# Patient Record
Sex: FEMALE | Race: BLACK OR AFRICAN AMERICAN | Employment: UNEMPLOYED | URBAN - METROPOLITAN AREA
[De-identification: names, ages, dates, MRNs, and addresses within clinical notes are randomized per-mention and may not be internally consistent; named-entity substitution may affect disease eponyms.]

---

## 2021-08-31 ENCOUNTER — HOSPITAL ENCOUNTER (EMERGENCY)
Age: 56
Discharge: STILL ADMITTED OR ADMINISTRATIVE | End: 2021-08-31

## 2021-11-17 ENCOUNTER — HOSPITAL ENCOUNTER (INPATIENT)
Age: 56
LOS: 2 days | Discharge: HOME | DRG: 194 | End: 2021-11-20
Attending: EMERGENCY MEDICINE | Admitting: FAMILY MEDICINE
Payer: MEDICAID

## 2021-11-17 ENCOUNTER — APPOINTMENT (OUTPATIENT)
Dept: GENERAL RADIOLOGY | Age: 56
DRG: 194 | End: 2021-11-17
Attending: EMERGENCY MEDICINE
Payer: MEDICAID

## 2021-11-17 DIAGNOSIS — R77.8 TROPONIN I ABOVE REFERENCE RANGE: ICD-10-CM

## 2021-11-17 DIAGNOSIS — R94.31 ABNORMAL EKG: ICD-10-CM

## 2021-11-17 DIAGNOSIS — R06.02 SOB (SHORTNESS OF BREATH): ICD-10-CM

## 2021-11-17 DIAGNOSIS — I50.9 ACUTE CONGESTIVE HEART FAILURE, UNSPECIFIED HEART FAILURE TYPE (HCC): Primary | ICD-10-CM

## 2021-11-17 PROCEDURE — 99285 EMERGENCY DEPT VISIT HI MDM: CPT

## 2021-11-17 PROCEDURE — 71046 X-RAY EXAM CHEST 2 VIEWS: CPT

## 2021-11-17 PROCEDURE — 94762 N-INVAS EAR/PLS OXIMTRY CONT: CPT

## 2021-11-17 RX ORDER — IPRATROPIUM BROMIDE AND ALBUTEROL SULFATE 2.5; .5 MG/3ML; MG/3ML
3 SOLUTION RESPIRATORY (INHALATION)
Status: COMPLETED | OUTPATIENT
Start: 2021-11-17 | End: 2021-11-18

## 2021-11-18 ENCOUNTER — APPOINTMENT (OUTPATIENT)
Dept: CT IMAGING | Age: 56
DRG: 194 | End: 2021-11-18
Attending: EMERGENCY MEDICINE
Payer: MEDICAID

## 2021-11-18 ENCOUNTER — APPOINTMENT (OUTPATIENT)
Dept: NON INVASIVE DIAGNOSTICS | Age: 56
DRG: 194 | End: 2021-11-18
Attending: FAMILY MEDICINE
Payer: MEDICAID

## 2021-11-18 PROBLEM — I50.9 ACUTE CHF (CONGESTIVE HEART FAILURE) (HCC): Status: ACTIVE | Noted: 2021-11-18

## 2021-11-18 PROBLEM — Z77.22 EXPOSURE TO SECOND HAND SMOKE: Status: ACTIVE | Noted: 2021-11-18

## 2021-11-18 PROBLEM — R77.8 ELEVATED TROPONIN: Status: ACTIVE | Noted: 2021-11-18

## 2021-11-18 LAB
ALBUMIN SERPL-MCNC: 3.5 G/DL (ref 3.4–5)
ALBUMIN/GLOB SERPL: 0.9 {RATIO} (ref 0.8–1.7)
ALP SERPL-CCNC: 114 U/L (ref 45–117)
ALT SERPL-CCNC: 24 U/L (ref 13–56)
ANION GAP SERPL CALC-SCNC: 6 MMOL/L (ref 3–18)
AST SERPL-CCNC: 17 U/L (ref 10–38)
ATRIAL RATE: 114 BPM
BASOPHILS # BLD: 0.1 K/UL (ref 0–0.1)
BASOPHILS NFR BLD: 1 % (ref 0–2)
BILIRUB SERPL-MCNC: 0.5 MG/DL (ref 0.2–1)
BNP SERPL-MCNC: 6793 PG/ML (ref 0–900)
BUN SERPL-MCNC: 16 MG/DL (ref 7–18)
BUN/CREAT SERPL: 15 (ref 12–20)
CALCIUM SERPL-MCNC: 9.1 MG/DL (ref 8.5–10.1)
CALCULATED P AXIS, ECG09: 27 DEGREES
CALCULATED R AXIS, ECG10: -13 DEGREES
CALCULATED T AXIS, ECG11: 21 DEGREES
CHLORIDE SERPL-SCNC: 109 MMOL/L (ref 100–111)
CHOLEST SERPL-MCNC: 150 MG/DL
CK MB CFR SERPL CALC: ABNORMAL % (ref 0–4)
CK MB CFR SERPL CALC: ABNORMAL % (ref 0–4)
CK MB SERPL-MCNC: <1 NG/ML (ref 5–25)
CK MB SERPL-MCNC: <1 NG/ML (ref 5–25)
CK SERPL-CCNC: 68 U/L (ref 26–192)
CK SERPL-CCNC: 83 U/L (ref 26–192)
CO2 SERPL-SCNC: 24 MMOL/L (ref 21–32)
COVID-19 RAPID TEST, COVR: NOT DETECTED
CREAT SERPL-MCNC: 1.1 MG/DL (ref 0.6–1.3)
D DIMER PPP FEU-MCNC: 0.54 UG/ML(FEU)
DIAGNOSIS, 93000: NORMAL
DIFFERENTIAL METHOD BLD: ABNORMAL
ECHO AO ROOT DIAM: 2.37 CM
ECHO AV AREA PEAK VELOCITY: 2.4 CM2
ECHO AV AREA VTI: 2.02 CM2
ECHO AV AREA/BSA PEAK VELOCITY: 1.2 CM2/M2
ECHO AV AREA/BSA VTI: 1 CM2/M2
ECHO AV MEAN GRADIENT: 4.67 MMHG
ECHO AV PEAK GRADIENT: 6.82 MMHG
ECHO AV PEAK VELOCITY: 130.58 CM/S
ECHO AV VTI: 23.89 CM
ECHO EST RA PRESSURE: 3 MMHG
ECHO IVC PROX: 1.97 CM
ECHO LA AREA 4C: 29.08 CM2
ECHO LA MAJOR AXIS: 4.4 CM
ECHO LA MINOR AXIS: 2.19 CM
ECHO LA VOL 2C: 63.47 ML (ref 22–52)
ECHO LA VOL 4C: 84.96 ML (ref 22–52)
ECHO LA VOL BP: 85.51 ML (ref 22–52)
ECHO LA VOL/BSA BIPLANE: 42.54 ML/M2 (ref 16–28)
ECHO LA VOLUME INDEX A2C: 31.58 ML/M2 (ref 16–28)
ECHO LA VOLUME INDEX A4C: 42.27 ML/M2 (ref 16–28)
ECHO LV E' LATERAL VELOCITY: 14.52 CM/S
ECHO LV E' SEPTAL VELOCITY: 5.24 CM/S
ECHO LV EDV A2C: 124.52 ML
ECHO LV EDV A4C: 139.84 ML
ECHO LV EDV BP: 132.34 ML (ref 56–104)
ECHO LV EDV INDEX A4C: 69.6 ML/M2
ECHO LV EDV INDEX BP: 65.8 ML/M2
ECHO LV EDV NDEX A2C: 62 ML/M2
ECHO LV EJECTION FRACTION A2C: 28 PERCENT
ECHO LV EJECTION FRACTION A4C: 38 PERCENT
ECHO LV EJECTION FRACTION BIPLANE: 33.2 PERCENT (ref 55–100)
ECHO LV ESV A2C: 89.54 ML
ECHO LV ESV A4C: 87.26 ML
ECHO LV ESV BP: 88.36 ML (ref 19–49)
ECHO LV ESV INDEX A2C: 44.5 ML/M2
ECHO LV ESV INDEX A4C: 43.4 ML/M2
ECHO LV ESV INDEX BP: 44 ML/M2
ECHO LV GLOBAL LONGITUDINAL STRAIN (GLS): -9.6 PERCENT
ECHO LV INTERNAL DIMENSION DIASTOLIC: 4.69 CM (ref 3.9–5.3)
ECHO LV INTERNAL DIMENSION SYSTOLIC: 3.8 CM
ECHO LV IVSD: 0.94 CM (ref 0.6–0.9)
ECHO LV MASS 2D: 170.6 G (ref 67–162)
ECHO LV MASS INDEX 2D: 84.9 G/M2 (ref 43–95)
ECHO LV POSTERIOR WALL DIASTOLIC: 1.12 CM (ref 0.6–0.9)
ECHO LVOT CARDIAC OUTPUT: 4.88 LITER/MINUTE
ECHO LVOT DIAM: 2.36 CM
ECHO LVOT PEAK GRADIENT: 2.05 MMHG
ECHO LVOT PEAK VELOCITY: 71.62 CM/S
ECHO LVOT SV: 48.3 ML
ECHO LVOT VTI: 11.05 CM
ECHO MV A VELOCITY: 35.83 CM/S
ECHO MV E DECELERATION TIME (DT): 54.41 MS
ECHO MV E VELOCITY: 80.4 CM/S
ECHO MV E/A RATIO: 2.24
ECHO MV E/E' LATERAL: 5.54
ECHO MV E/E' RATIO (AVERAGED): 10.44
ECHO MV E/E' SEPTAL: 15.34
ECHO MV REGURGITANT VTIA: 135.11 CM
ECHO RA AREA 4C: 20.27 CM2
ECHO RV INTERNAL DIMENSION: 3.44 CM
ECHO RV TAPSE: 1.25 CM (ref 1.5–2)
ECHO TV REGURGITANT MAX VELOCITY: 335.83 CM/S
ECHO TV REGURGITANT PEAK GRADIENT: 45.11 MMHG
EOSINOPHIL # BLD: 0.2 K/UL (ref 0–0.4)
EOSINOPHIL NFR BLD: 2 % (ref 0–5)
ERYTHROCYTE [DISTWIDTH] IN BLOOD BY AUTOMATED COUNT: 14.3 % (ref 11.6–14.5)
ERYTHROCYTE [DISTWIDTH] IN BLOOD BY AUTOMATED COUNT: 14.5 % (ref 11.6–14.5)
FLUAV AG NPH QL IA: NEGATIVE
FLUBV AG NOSE QL IA: NEGATIVE
GLOBAL LONGITUDINAL STRAIN 2 CHAMBER: -9.9 PERCENT
GLOBAL LONGITUDINAL STRAIN 4 CHAMBER: -9.9 PERCENT
GLOBAL LONGITUDINAL STRAIN LONG AXIS: -9.1 PERCENT
GLOBULIN SER CALC-MCNC: 3.7 G/DL (ref 2–4)
GLUCOSE SERPL-MCNC: 100 MG/DL (ref 74–99)
HCT VFR BLD AUTO: 38.8 % (ref 35–45)
HCT VFR BLD AUTO: 40 % (ref 35–45)
HDLC SERPL-MCNC: 39 MG/DL (ref 40–60)
HDLC SERPL: 3.8 {RATIO} (ref 0–5)
HGB BLD-MCNC: 12 G/DL (ref 12–16)
HGB BLD-MCNC: 12.5 G/DL (ref 12–16)
IMM GRANULOCYTES # BLD AUTO: 0 K/UL (ref 0–0.04)
IMM GRANULOCYTES NFR BLD AUTO: 0 % (ref 0–0.5)
LA VOL DISK BP: 81.96 ML (ref 22–52)
LDLC SERPL CALC-MCNC: 93.2 MG/DL (ref 0–100)
LIPID PROFILE,FLP: ABNORMAL
LVOT MG: 1.21 MMHG
LYMPHOCYTES # BLD: 1.9 K/UL (ref 0.9–3.6)
LYMPHOCYTES NFR BLD: 18 % (ref 21–52)
MAGNESIUM SERPL-MCNC: 1.9 MG/DL (ref 1.6–2.6)
MCH RBC QN AUTO: 26.5 PG (ref 24–34)
MCH RBC QN AUTO: 26.6 PG (ref 24–34)
MCHC RBC AUTO-ENTMCNC: 30.9 G/DL (ref 31–37)
MCHC RBC AUTO-ENTMCNC: 31.3 G/DL (ref 31–37)
MCV RBC AUTO: 84.9 FL (ref 78–100)
MCV RBC AUTO: 86 FL (ref 78–100)
MONOCYTES # BLD: 0.6 K/UL (ref 0.05–1.2)
MONOCYTES NFR BLD: 6 % (ref 3–10)
MR MG: 74.39 MMHG
MV DEC SLOPE: 14.78 METER/SECOND2
NEUTS SEG # BLD: 7.5 K/UL (ref 1.8–8)
NEUTS SEG NFR BLD: 73 % (ref 40–73)
NRBC # BLD: 0 K/UL (ref 0–0.01)
NRBC # BLD: 0 K/UL (ref 0–0.01)
NRBC BLD-RTO: 0 PER 100 WBC
NRBC BLD-RTO: 0 PER 100 WBC
P-R INTERVAL, ECG05: 170 MS
PLATELET # BLD AUTO: 292 K/UL (ref 135–420)
PLATELET # BLD AUTO: 344 K/UL (ref 135–420)
PMV BLD AUTO: 10.1 FL (ref 9.2–11.8)
PMV BLD AUTO: 10.6 FL (ref 9.2–11.8)
POTASSIUM SERPL-SCNC: 3.8 MMOL/L (ref 3.5–5.5)
PROT SERPL-MCNC: 7.2 G/DL (ref 6.4–8.2)
Q-T INTERVAL, ECG07: 330 MS
QRS DURATION, ECG06: 94 MS
QTC CALCULATION (BEZET), ECG08: 454 MS
RBC # BLD AUTO: 4.51 M/UL (ref 4.2–5.3)
RBC # BLD AUTO: 4.71 M/UL (ref 4.2–5.3)
SARS-COV-2, COV2: NORMAL
SARS-COV-2, NAA: NOT DETECTED
SODIUM SERPL-SCNC: 139 MMOL/L (ref 136–145)
SOURCE, COVRS: NORMAL
T4 FREE SERPL-MCNC: 1.2 NG/DL (ref 0.7–1.5)
TRIGL SERPL-MCNC: 89 MG/DL (ref ?–150)
TROPONIN I SERPL-MCNC: 0.15 NG/ML (ref 0–0.04)
TROPONIN I SERPL-MCNC: 0.17 NG/ML (ref 0–0.04)
TSH SERPL DL<=0.05 MIU/L-ACNC: 0.58 UIU/ML (ref 0.36–3.74)
VENTRICULAR RATE, ECG03: 114 BPM
VLDLC SERPL CALC-MCNC: 17.8 MG/DL
WBC # BLD AUTO: 10.3 K/UL (ref 4.6–13.2)
WBC # BLD AUTO: 9.3 K/UL (ref 4.6–13.2)

## 2021-11-18 PROCEDURE — 74011250636 HC RX REV CODE- 250/636: Performed by: FAMILY MEDICINE

## 2021-11-18 PROCEDURE — 84443 ASSAY THYROID STIM HORMONE: CPT

## 2021-11-18 PROCEDURE — 36415 COLL VENOUS BLD VENIPUNCTURE: CPT

## 2021-11-18 PROCEDURE — U0005 INFEC AGEN DETEC AMPLI PROBE: HCPCS

## 2021-11-18 PROCEDURE — 85027 COMPLETE CBC AUTOMATED: CPT

## 2021-11-18 PROCEDURE — 74011250636 HC RX REV CODE- 250/636: Performed by: EMERGENCY MEDICINE

## 2021-11-18 PROCEDURE — 74011250637 HC RX REV CODE- 250/637: Performed by: EMERGENCY MEDICINE

## 2021-11-18 PROCEDURE — 80061 LIPID PANEL: CPT

## 2021-11-18 PROCEDURE — 82553 CREATINE MB FRACTION: CPT

## 2021-11-18 PROCEDURE — 85379 FIBRIN DEGRADATION QUANT: CPT

## 2021-11-18 PROCEDURE — 74011000636 HC RX REV CODE- 636: Performed by: EMERGENCY MEDICINE

## 2021-11-18 PROCEDURE — 77030013140 HC MSK NEB VYRM -A

## 2021-11-18 PROCEDURE — 83735 ASSAY OF MAGNESIUM: CPT

## 2021-11-18 PROCEDURE — 83880 ASSAY OF NATRIURETIC PEPTIDE: CPT

## 2021-11-18 PROCEDURE — 85025 COMPLETE CBC W/AUTO DIFF WBC: CPT

## 2021-11-18 PROCEDURE — 93005 ELECTROCARDIOGRAM TRACING: CPT

## 2021-11-18 PROCEDURE — 84439 ASSAY OF FREE THYROXINE: CPT

## 2021-11-18 PROCEDURE — 74011000250 HC RX REV CODE- 250: Performed by: EMERGENCY MEDICINE

## 2021-11-18 PROCEDURE — 80053 COMPREHEN METABOLIC PANEL: CPT

## 2021-11-18 PROCEDURE — 93306 TTE W/DOPPLER COMPLETE: CPT

## 2021-11-18 PROCEDURE — 71275 CT ANGIOGRAPHY CHEST: CPT

## 2021-11-18 PROCEDURE — 74011250637 HC RX REV CODE- 250/637: Performed by: INTERNAL MEDICINE

## 2021-11-18 PROCEDURE — 74011250636 HC RX REV CODE- 250/636: Performed by: HOSPITALIST

## 2021-11-18 PROCEDURE — 74011250637 HC RX REV CODE- 250/637: Performed by: FAMILY MEDICINE

## 2021-11-18 PROCEDURE — 87804 INFLUENZA ASSAY W/OPTIC: CPT

## 2021-11-18 PROCEDURE — 65660000000 HC RM CCU STEPDOWN

## 2021-11-18 PROCEDURE — 96374 THER/PROPH/DIAG INJ IV PUSH: CPT

## 2021-11-18 PROCEDURE — 94640 AIRWAY INHALATION TREATMENT: CPT

## 2021-11-18 PROCEDURE — 87635 SARS-COV-2 COVID-19 AMP PRB: CPT

## 2021-11-18 RX ORDER — POLYETHYLENE GLYCOL 3350 17 G/17G
17 POWDER, FOR SOLUTION ORAL DAILY PRN
Status: DISCONTINUED | OUTPATIENT
Start: 2021-11-18 | End: 2021-11-20 | Stop reason: HOSPADM

## 2021-11-18 RX ORDER — FUROSEMIDE 10 MG/ML
20 INJECTION INTRAMUSCULAR; INTRAVENOUS ONCE
Status: COMPLETED | OUTPATIENT
Start: 2021-11-18 | End: 2021-11-18

## 2021-11-18 RX ORDER — FUROSEMIDE 10 MG/ML
20 INJECTION INTRAMUSCULAR; INTRAVENOUS EVERY 12 HOURS
Status: DISCONTINUED | OUTPATIENT
Start: 2021-11-18 | End: 2021-11-19

## 2021-11-18 RX ORDER — SODIUM CHLORIDE 0.9 % (FLUSH) 0.9 %
5-40 SYRINGE (ML) INJECTION AS NEEDED
Status: DISCONTINUED | OUTPATIENT
Start: 2021-11-18 | End: 2021-11-20 | Stop reason: HOSPADM

## 2021-11-18 RX ORDER — ACETAMINOPHEN 325 MG/1
650 TABLET ORAL
Status: DISCONTINUED | OUTPATIENT
Start: 2021-11-18 | End: 2021-11-19 | Stop reason: SDUPTHER

## 2021-11-18 RX ORDER — FUROSEMIDE 10 MG/ML
40 INJECTION INTRAMUSCULAR; INTRAVENOUS EVERY 12 HOURS
Status: DISCONTINUED | OUTPATIENT
Start: 2021-11-18 | End: 2021-11-18

## 2021-11-18 RX ORDER — SODIUM CHLORIDE 0.9 % (FLUSH) 0.9 %
5-40 SYRINGE (ML) INJECTION EVERY 8 HOURS
Status: DISCONTINUED | OUTPATIENT
Start: 2021-11-18 | End: 2021-11-20 | Stop reason: HOSPADM

## 2021-11-18 RX ORDER — AMLODIPINE BESYLATE 5 MG/1
10 TABLET ORAL DAILY
Status: DISCONTINUED | OUTPATIENT
Start: 2021-11-18 | End: 2021-11-18

## 2021-11-18 RX ORDER — METOPROLOL TARTRATE 25 MG/1
12.5 TABLET, FILM COATED ORAL EVERY 12 HOURS
Status: DISCONTINUED | OUTPATIENT
Start: 2021-11-18 | End: 2021-11-18

## 2021-11-18 RX ORDER — ENOXAPARIN SODIUM 100 MG/ML
1 INJECTION SUBCUTANEOUS EVERY 12 HOURS
Status: DISCONTINUED | OUTPATIENT
Start: 2021-11-18 | End: 2021-11-18

## 2021-11-18 RX ORDER — ONDANSETRON 2 MG/ML
4 INJECTION INTRAMUSCULAR; INTRAVENOUS
Status: DISCONTINUED | OUTPATIENT
Start: 2021-11-18 | End: 2021-11-20 | Stop reason: HOSPADM

## 2021-11-18 RX ORDER — ISOSORBIDE DINITRATE 5 MG/1
2.5 TABLET ORAL 2 TIMES DAILY
Status: DISCONTINUED | OUTPATIENT
Start: 2021-11-19 | End: 2021-11-20 | Stop reason: HOSPADM

## 2021-11-18 RX ORDER — HYDRALAZINE HYDROCHLORIDE 10 MG/1
10 TABLET, FILM COATED ORAL DAILY
COMMUNITY
End: 2021-11-20

## 2021-11-18 RX ORDER — GUAIFENESIN 100 MG/5ML
324 LIQUID (ML) ORAL
Status: COMPLETED | OUTPATIENT
Start: 2021-11-18 | End: 2021-11-18

## 2021-11-18 RX ORDER — NITROGLYCERIN 0.4 MG/1
0.4 TABLET SUBLINGUAL
Status: DISCONTINUED | OUTPATIENT
Start: 2021-11-18 | End: 2021-11-20 | Stop reason: HOSPADM

## 2021-11-18 RX ORDER — HYDRALAZINE HYDROCHLORIDE 10 MG/1
10 TABLET, FILM COATED ORAL 3 TIMES DAILY
Status: DISCONTINUED | OUTPATIENT
Start: 2021-11-18 | End: 2021-11-20 | Stop reason: HOSPADM

## 2021-11-18 RX ORDER — ACETAMINOPHEN 325 MG/1
650 TABLET ORAL
Status: DISCONTINUED | OUTPATIENT
Start: 2021-11-18 | End: 2021-11-20 | Stop reason: HOSPADM

## 2021-11-18 RX ORDER — ENOXAPARIN SODIUM 100 MG/ML
40 INJECTION SUBCUTANEOUS EVERY 24 HOURS
Status: DISCONTINUED | OUTPATIENT
Start: 2021-11-19 | End: 2021-11-20 | Stop reason: HOSPADM

## 2021-11-18 RX ORDER — DOCUSATE SODIUM 100 MG/1
100 CAPSULE, LIQUID FILLED ORAL AS NEEDED
Status: DISCONTINUED | OUTPATIENT
Start: 2021-11-18 | End: 2021-11-20 | Stop reason: HOSPADM

## 2021-11-18 RX ORDER — ENOXAPARIN SODIUM 100 MG/ML
40 INJECTION SUBCUTANEOUS DAILY
Status: DISCONTINUED | OUTPATIENT
Start: 2021-11-18 | End: 2021-11-18

## 2021-11-18 RX ORDER — ISOSORBIDE DINITRATE 5 MG/1
5 TABLET ORAL 2 TIMES DAILY
Status: DISCONTINUED | OUTPATIENT
Start: 2021-11-19 | End: 2021-11-18

## 2021-11-18 RX ORDER — ONDANSETRON 2 MG/ML
4 INJECTION INTRAMUSCULAR; INTRAVENOUS
Status: DISCONTINUED | OUTPATIENT
Start: 2021-11-18 | End: 2021-11-19 | Stop reason: SDUPTHER

## 2021-11-18 RX ORDER — METOPROLOL SUCCINATE 25 MG/1
25 TABLET, EXTENDED RELEASE ORAL DAILY
Status: DISCONTINUED | OUTPATIENT
Start: 2021-11-19 | End: 2021-11-19

## 2021-11-18 RX ORDER — ONDANSETRON 4 MG/1
4 TABLET, ORALLY DISINTEGRATING ORAL
Status: DISCONTINUED | OUTPATIENT
Start: 2021-11-18 | End: 2021-11-20 | Stop reason: HOSPADM

## 2021-11-18 RX ORDER — ACETAMINOPHEN 650 MG/1
650 SUPPOSITORY RECTAL
Status: DISCONTINUED | OUTPATIENT
Start: 2021-11-18 | End: 2021-11-20 | Stop reason: HOSPADM

## 2021-11-18 RX ORDER — ASPIRIN 81 MG/1
81 TABLET ORAL DAILY
Status: DISCONTINUED | OUTPATIENT
Start: 2021-11-18 | End: 2021-11-20 | Stop reason: HOSPADM

## 2021-11-18 RX ADMIN — AMLODIPINE BESYLATE 10 MG: 5 TABLET ORAL at 09:38

## 2021-11-18 RX ADMIN — Medication 10 ML: at 06:31

## 2021-11-18 RX ADMIN — IPRATROPIUM BROMIDE AND ALBUTEROL SULFATE 3 ML: .5; 3 SOLUTION RESPIRATORY (INHALATION) at 00:23

## 2021-11-18 RX ADMIN — FUROSEMIDE 40 MG: 10 INJECTION, SOLUTION INTRAMUSCULAR; INTRAVENOUS at 09:37

## 2021-11-18 RX ADMIN — ASPIRIN 324 MG: 81 TABLET, CHEWABLE ORAL at 02:30

## 2021-11-18 RX ADMIN — FUROSEMIDE 20 MG: 10 INJECTION, SOLUTION INTRAMUSCULAR; INTRAVENOUS at 20:40

## 2021-11-18 RX ADMIN — ENOXAPARIN SODIUM 90 MG: 100 INJECTION SUBCUTANEOUS at 08:53

## 2021-11-18 RX ADMIN — ASPIRIN 81 MG: 81 TABLET, COATED ORAL at 09:38

## 2021-11-18 RX ADMIN — Medication 10 ML: at 15:38

## 2021-11-18 RX ADMIN — IOPAMIDOL 75 ML: 755 INJECTION, SOLUTION INTRAVENOUS at 02:55

## 2021-11-18 RX ADMIN — HYDRALAZINE HYDROCHLORIDE 10 MG: 10 TABLET, FILM COATED ORAL at 22:24

## 2021-11-18 RX ADMIN — ENOXAPARIN SODIUM 90 MG: 100 INJECTION SUBCUTANEOUS at 20:40

## 2021-11-18 RX ADMIN — METOPROLOL TARTRATE 12.5 MG: 25 TABLET, FILM COATED ORAL at 09:38

## 2021-11-18 RX ADMIN — FUROSEMIDE 20 MG: 10 INJECTION, SOLUTION INTRAMUSCULAR; INTRAVENOUS at 03:33

## 2021-11-18 NOTE — PROGRESS NOTES
Hospitalist Progress Note    Patient: Lake Olivares MRN: [id number]  CSN: [id number]    YOB: 1965  Age: 63 y.o. Sex: female    DOA: 11/17/2021 LOS:  LOS: 0 days          Chief Complaint:    SOB      Assessment/Plan     65-year-old woman with a history of hypertension it appears as if it may have been largely uncontrolled at home presents to the emergency department complaint of a 2-day history of worsening cough and shortness of breath. Admitted for possible new onset CHF     New onset congestive heart failure with elevated proBNP of greater than 6700  Uncontrolled hypertension  Mildly elevated D-dimer  Elevated troponin  Exposure to second-hand smoke    Echo result pending    BP coming under better control    Cardiology consult    DVT prophylaxis  Lasix has been given and she feels better    Daily lytes        Lovenox 1 mg/kg as precaution and defer continuing to cardiology     Had prior history of developing acute renal failure approximately 5 years ago and taken ACE inhibitor was discontinued so that we will not restart during this inpatient stay        DVT prophylaxis covered by Lovenox  GI prophylaxis covered by Pepcid     Code status: full      Disposition :  Patient Active Problem List   Diagnosis Code    Hypertension, accelerated I10    New onset of congestive heart failure ([facility] [address] I50.9    Elevated troponin R77.8    Exposure to second hand smoke Z77.22       Subjective:    I feel better  No complaints of cp, Palp, SOB now    Review of systems:      Respiratory: denies SOB, cough  Cardiovascular: denies chest pain, palpitations  Gastrointestinal: denies nausea, vomiting, diarrhea      Vital signs/Intake and Output:  Visit Vitals  BP (!) 152/101   Pulse 99   Temp 97.8 °F (36.6 °C)   Resp 22   Ht 5' 5\" (1.651 m)   Wt 94.9 kg (209 lb 3.5 oz)   SpO2 99%   BMI 34.82 kg/m²     Current Shift:  No intake/output data recorded.   Last three shifts:  No intake/output data recorded. Exam:    General: Well developed, alert, NAD, OX3  CVS:Regular rate and rhythm, no M/R/G, S1/S2 heard, no thrill  Lungs:Clear to auscultation bilaterally, no wheezes, rhonchi, or rales  Abdomen: Soft, Nontender, No distention, Normal Bowel sounds, No hepatomegaly  Extremities: 1 plus ankle edema LE BL  Neuro:grossly normal , follows commands  Psych:appropriate                Labs: Results:       Chemistry Recent Labs     11/18/21  0020   *      K 3.8      CO2 24   BUN 16   CREA 1.10   CA 9.1   AGAP 6   BUCR 15      TP 7.2   ALB 3.5   GLOB 3.7   AGRAT 0.9      CBC w/Diff Recent Labs     11/18/21  0538 11/18/21  0020   WBC 9.3 10.3   RBC 4.51 4.71   HGB 12.0 12.5   HCT 38.8 40.0    344   GRANS  --  73   LYMPH  --  18*   EOS  --  2      Cardiac Enzymes Recent Labs     11/18/21  0345 11/18/21  0020   CPK 68 83   CKND1 CALCULATION NOT PERFORMED WHEN RESULT IS BELOW LINEAR LIMIT CALCULATION NOT PERFORMED WHEN RESULT IS BELOW LINEAR LIMIT      Coagulation No results for input(s): PTP, INR, APTT, INREXT in the last 72 hours. Lipid Panel No results found for: CHOL, CHOLPOCT, CHOLX, CHLST, CHOLV, 083387, HDL, HDLP, LDL, LDLC, DLDLP, 219133, VLDLC, VLDL, TGLX, TRIGL, TRIGP, TGLPOCT, CHHD, CHHDX   BNP No results for input(s): BNPP in the last 72 hours.    Liver Enzymes Recent Labs     11/18/21  0020   TP 7.2   ALB 3.5         Thyroid Studies Lab Results   Component Value Date/Time    TSH 0.58 11/18/2021 05:38 AM        Procedures/imaging: see electronic medical records for all procedures/Xrays and details which were not copied into this note but were reviewed prior to creation of Gila Montero MD

## 2021-11-18 NOTE — PROGRESS NOTES
Problem: General Medical Care Plan  Goal: *Vital signs within specified parameters  Outcome: Progressing Towards Goal  Goal: *Labs within defined limits  Outcome: Progressing Towards Goal  Goal: *Absence of infection signs and symptoms  Outcome: Progressing Towards Goal  Goal: *Optimal pain control at patient's stated goal  Outcome: Progressing Towards Goal  Goal: *Skin integrity maintained  Outcome: Progressing Towards Goal  Goal: *Fluid volume balance  Outcome: Progressing Towards Goal  Goal: *Optimize nutritional status  Outcome: Progressing Towards Goal  Goal: *Anxiety reduced or absent  Outcome: Progressing Towards Goal  Goal: *Progressive mobility and function (eg: ADL's)  Outcome: Progressing Towards Goal     Problem: Patient Education: Go to Patient Education Activity  Goal: Patient/Family Education  Outcome: Progressing Towards Goal     Problem: Pain  Goal: *Control of Pain  Outcome: Progressing Towards Goal  Goal: *PALLIATIVE CARE:  Alleviation of Pain  Outcome: Progressing Towards Goal     Problem: Patient Education: Go to Patient Education Activity  Goal: Patient/Family Education  Outcome: Progressing Towards Goal     Problem: Falls - Risk of  Goal: *Absence of Falls  Description: Document Aixa Fall Risk and appropriate interventions in the flowsheet.   Outcome: Progressing Towards Goal  Note: Fall Risk Interventions:                                Problem: Patient Education: Go to Patient Education Activity  Goal: Patient/Family Education  Outcome: Progressing Towards Goal TBA

## 2021-11-18 NOTE — ROUTINE PROCESS
TRANSFER - IN REPORT:    Verbal report received from Balbir Foley RN(name) on Maggie Paul  being received from ED(unit) for routine progression of care      Report consisted of patients Situation, Background, Assessment and   Recommendations(SBAR). Information from the following report(s) SBAR, Kardex, ED Summary, Intake/Output, MAR, Recent Results, Med Rec Status and Cardiac Rhythm Tachy was reviewed with the receiving nurse. Opportunity for questions and clarification was provided. Assessment completed upon patients arrival to unit and care assumed. 0430 Patient assessment completed patient is resting quietly with eyes wide open and chest rising and falling evenly. No signs of pain or complaints of discomfort. Call bell is within reach. 2091 Patient requested something for her cough. I called the hospitalist for the patient. Bedside and Verbal shift change report given to Lorri1 Linnette Pederson (oncoming nurse) by Judith Carrion RN (offgoing nurse). Report included the following information SBAR, Kardex, ED Summary, Intake/Output, MAR, Recent Results, Med Rec Status and Cardiac Rhythm NSR.

## 2021-11-18 NOTE — ED TRIAGE NOTES
\"I have been sob since yesterday and I have had a cough as well. I have no energy\".  VSS nad noted 98% RA

## 2021-11-18 NOTE — PROGRESS NOTES
Care Management    Reason for Admission: new onset of congestive heart failure    Chart reviewed. Per H&P: Melba Stallworth is a 63 y.o. female   with a history of hypertension that appears as if it may have been largely uncontrolled at home, presents to emergency department complaining of a 2 to 3-day history of cough, shortness of breath with the cough being productive of clear sputum. She denies chest pain, fever, chills, night sweats, nausea, vomiting or diarrhea. Her symptoms appear to be progressively worsening. She reported to the ED team that she has sick contacts with her grandchildren but they were diagnosed with croup. She is a non-smoker, denies any history of lung disease like asthma, COPD or emphysema. She is also never had a PE or MI either. In emergency room when she presented she was tachypneic with a breathing rate in the 30s, blood pressure was quite elevated 180s over 100s, however she was not hypoxic. He was also tachycardic in the 110s and heart rate got as high as 120. She has been on still in the area of entire time and has not required any oxygen.   Patient herself does not smoke however she lives with her significant other who has smoked in the house for the past 9 years and her adult daughter who also lives in the house also smokes in the house.        Prior to admission patient was living: with her daughter Amadou coker    Prior to admission patient was using the following DME:  None, does not use home O2                   RUR Score: 5%                   Plan for utilizing home health:   TBD       COVID Vaccine Status:     PCP: First and Last name: Danielle Lino MD    Name of Practice:    Are you a current patient: Yes/No: Yes   Approximate date of last visit: July 2020   Can you participate in a virtual visit with your PCP: Yes                    Current Advanced Directive/Advance Care Plan: Full Code    Healthcare Decision Maker: Daughter Linda Underwood  Click here to complete HealthCare Decision Makers including selection of the Healthcare Decision Maker Relationship (ie \"Primary\")                         Transition of Care Plan: In progress     Care Management/Patient Conversation: CM spoke with patient at bedside. Patient confirmed contact info, primary contact (her daughter Shahida Muse), that she lives with her daughter Amadou coker, and that she last saw her PCP in July 2020. Patient denies using home O2 or any other DME. Patient reports she does drive and will have a ride available with family when she discharges. Care Management Interventions  PCP Verified by CM:  Yes  Last Visit to PCP: 07/01/20  Mode of Transport at Discharge:  (family)  Transition of Care Consult (CM Consult): Discharge Planning  Support Systems: Child(quinn)  Confirm Follow Up Transport: Family  The Plan for Transition of Care is Related to the Following Treatment Goals : home with family assistance and physician follow up with possible homw O2  Discharge Location  Discharge Placement:  (home with family assistance, physician follow up and possible home O2)

## 2021-11-18 NOTE — PROGRESS NOTES
Telephone report given to [address] [place] [address] on [address] [facility]. Pt transferred to  356 via w/c with cardiac monitor. Pt tolerated well and in no apparent distress. RN at bedside.

## 2021-11-18 NOTE — ED PROVIDER NOTES
EMERGENCY DEPARTMENT HISTORY AND PHYSICAL EXAM    11:10 PM    Date: 11/17/2021  Patient Name: Mima Lucero    History of Presenting Illness     Chief Complaint   Patient presents with    Shortness of Breath       History Provided By: Patient and Patient's   Location/Duration/Severity/Modifying factors   Patient is a pleasant 71-year-old female with no ongoing past medical history hypertension presenting to the emergency department with a chief complaint of cough and shortness of breath. She reports that has been going on for the past 3 to 4 days but worse in the past 2 days. Reports she was around her grandchildren who have been sick recently. They were diagnosed with croup. She reports a cough which is productive of clear sputum. She denies any chest pain associated with this but she feels like her breathing is the main concern that she has. She denies any vomiting or diarrhea. She denies any abdominal pain except for some mild flank pain which she associates with the coughing as it certainly present with coughing. No black or bloody stools. No history of DVT or PE in the past.  Nothing makes her symptoms better or worse. Rates her symptoms as moderate. Patient is a non-smoker she denies any history of chronic lung disease          PCP: Ryan Metzger MD    Current Facility-Administered Medications   Medication Dose Route Frequency Provider Last Rate Last Admin    ondansetron (ZOFRAN) injection 4 mg  4 mg IntraVENous Q6H PRN Ac Willett MD        acetaminophen (TYLENOL) tablet 650 mg  650 mg Oral Q6H PRN Ac Willett MD        furosemide (LASIX) injection 40 mg  40 mg IntraVENous Q12H Ac Willett MD        nitroglycerin (NITROSTAT) tablet 0.4 mg  0.4 mg SubLINGual Q5MIN PRN Ac Willett MD        aspirin delayed-release tablet 81 mg  81 mg Oral DAILY Ac Willett MD        docusate sodium (COLACE) capsule 100 mg  100 mg Oral PRN Danilo Mo MD        sodium chloride (NS) flush 5-40 mL  5-40 mL IntraVENous Q8H Familia Willett MD        sodium chloride (NS) flush 5-40 mL  5-40 mL IntraVENous PRN Ellen Willett MD        acetaminophen (TYLENOL) tablet 650 mg  650 mg Oral Q6H PRN Ellen Willett MD        Or    acetaminophen (TYLENOL) suppository 650 mg  650 mg Rectal Q6H PRN Ellen Willett MD        polyethylene glycol (MIRALAX) packet 17 g  17 g Oral DAILY PRN Familia Willett MD        ondansetron (ZOFRAN ODT) tablet 4 mg  4 mg Oral Q8H PRN Familia Willett MD        Or    ondansetron (ZOFRAN) injection 4 mg  4 mg IntraVENous Q6H PRN Ellen Willett MD           Past History     Past Medical History:  Past Medical History:   Diagnosis Date    Hypertension        Past Surgical History:  Past Surgical History:   Procedure Laterality Date    HX GYN      hysterectomy       Family History:  No family history on file. Social History:  Social History     Tobacco Use    Smoking status: Never Smoker    Smokeless tobacco: Not on file   Substance Use Topics    Alcohol use: No    Drug use: No       Allergies:  No Known Allergies    I reviewed and confirmed the above information with patient and updated as necessary. Review of Systems     Review of Systems   Constitutional: Negative for chills and fever. HENT: Negative for congestion, rhinorrhea, sinus pressure and sneezing. Eyes: Negative for visual disturbance. Respiratory: Positive for cough and shortness of breath. Negative for wheezing. Cardiovascular: Negative for chest pain and leg swelling. Gastrointestinal: Negative for abdominal pain, diarrhea, nausea and vomiting. Genitourinary: Negative for dysuria, frequency, urgency, vaginal bleeding and vaginal discharge. Musculoskeletal: Negative for back pain and neck pain. Skin: Negative for rash.    Neurological: Negative for syncope, numbness and headaches. Physical Exam     Visit Vitals  BP (!) 163/101   Pulse (!) 107   Temp 97.9 °F (36.6 °C)   Resp 24   SpO2 99%       Physical Exam  Vitals and nursing note reviewed. Constitutional:       General: She is not in acute distress. Appearance: Normal appearance. She is normal weight. HENT:      Head: Normocephalic and atraumatic. Right Ear: External ear normal.      Left Ear: External ear normal.      Nose: Nose normal.      Mouth/Throat:      Mouth: Mucous membranes are moist.   Eyes:      Conjunctiva/sclera: Conjunctivae normal.      Pupils: Pupils are equal, round, and reactive to light. Cardiovascular:      Rate and Rhythm: Normal rate and regular rhythm. Pulses: Normal pulses. Heart sounds: Normal heart sounds. No murmur heard. Pulmonary:      Effort: Pulmonary effort is normal. Tachypnea (Patient demonstrated mild resting tachypnea) present. Breath sounds: Examination of the right-lower field reveals decreased breath sounds. Examination of the left-lower field reveals decreased breath sounds. Decreased breath sounds present. No wheezing, rhonchi or rales. Abdominal:      General: Abdomen is flat. Palpations: Abdomen is soft. Tenderness: There is no abdominal tenderness. There is no guarding or rebound. Musculoskeletal:         General: No swelling or tenderness. Normal range of motion. Cervical back: Normal range of motion and neck supple. Right lower leg: No tenderness. No edema. Left lower leg: No tenderness. No edema. Skin:     General: Skin is warm and dry. Capillary Refill: Capillary refill takes less than 2 seconds. Findings: No rash. Neurological:      General: No focal deficit present. Mental Status: She is alert. Cranial Nerves: No cranial nerve deficit. Motor: No weakness.          Diagnostic Study Results     Labs -  Recent Results (from the past 24 hour(s))   EKG, 12 LEAD, INITIAL    Collection Time: 11/18/21 12:10 AM   Result Value Ref Range    Ventricular Rate 114 BPM    Atrial Rate 114 BPM    P-R Interval 170 ms    QRS Duration 94 ms    Q-T Interval 330 ms    QTC Calculation (Bezet) 454 ms    Calculated P Axis 27 degrees    Calculated R Axis -13 degrees    Calculated T Axis 21 degrees    Diagnosis       Sinus tachycardia with occasional premature ventricular complexes  Minimal voltage criteria for LVH, may be normal variant ( Sullivan product )  Borderline ECG  When compared with ECG of 28-FEB-2016 02:58,  premature ventricular complexes are now present     CBC WITH AUTOMATED DIFF    Collection Time: 11/18/21 12:20 AM   Result Value Ref Range    WBC 10.3 4.6 - 13.2 K/uL    RBC 4.71 4.20 - 5.30 M/uL    HGB 12.5 12.0 - 16.0 g/dL    HCT 40.0 35.0 - 45.0 %    MCV 84.9 78.0 - 100.0 FL    MCH 26.5 24.0 - 34.0 PG    MCHC 31.3 31.0 - 37.0 g/dL    RDW 14.3 11.6 - 14.5 %    PLATELET 223 275 - 204 K/uL    MPV 10.6 9.2 - 11.8 FL    NRBC 0.0 0  WBC    ABSOLUTE NRBC 0.00 0.00 - 0.01 K/uL    NEUTROPHILS 73 40 - 73 %    LYMPHOCYTES 18 (L) 21 - 52 %    MONOCYTES 6 3 - 10 %    EOSINOPHILS 2 0 - 5 %    BASOPHILS 1 0 - 2 %    IMMATURE GRANULOCYTES 0 0.0 - 0.5 %    ABS. NEUTROPHILS 7.5 1.8 - 8.0 K/UL    ABS. LYMPHOCYTES 1.9 0.9 - 3.6 K/UL    ABS. MONOCYTES 0.6 0.05 - 1.2 K/UL    ABS. EOSINOPHILS 0.2 0.0 - 0.4 K/UL    ABS. BASOPHILS 0.1 0.0 - 0.1 K/UL    ABS. IMM.  GRANS. 0.0 0.00 - 0.04 K/UL    DF AUTOMATED     METABOLIC PANEL, COMPREHENSIVE    Collection Time: 11/18/21 12:20 AM   Result Value Ref Range    Sodium 139 136 - 145 mmol/L    Potassium 3.8 3.5 - 5.5 mmol/L    Chloride 109 100 - 111 mmol/L    CO2 24 21 - 32 mmol/L    Anion gap 6 3.0 - 18 mmol/L    Glucose 100 (H) 74 - 99 mg/dL    BUN 16 7.0 - 18 MG/DL    Creatinine 1.10 0.6 - 1.3 MG/DL    BUN/Creatinine ratio 15 12 - 20      GFR est AA >60 >60 ml/min/1.73m2    GFR est non-AA 51 (L) >60 ml/min/1.73m2    Calcium 9.1 8.5 - 10.1 MG/DL    Bilirubin, total 0.5 0.2 - 1.0 MG/DL    ALT (SGPT) 24 13 - 56 U/L    AST (SGOT) 17 10 - 38 U/L    Alk. phosphatase 114 45 - 117 U/L    Protein, total 7.2 6.4 - 8.2 g/dL    Albumin 3.5 3.4 - 5.0 g/dL    Globulin 3.7 2.0 - 4.0 g/dL    A-G Ratio 0.9 0.8 - 1.7     NT-PRO BNP    Collection Time: 11/18/21 12:20 AM   Result Value Ref Range    NT pro-BNP 6,793 (H) 0 - 900 PG/ML   CARDIAC PANEL,(CK, CKMB & TROPONIN)    Collection Time: 11/18/21 12:20 AM   Result Value Ref Range    CK - MB <1.0 <3.6 ng/ml    CK-MB Index  0.0 - 4.0 %     CALCULATION NOT PERFORMED WHEN RESULT IS BELOW LINEAR LIMIT    CK 83 26 - 192 U/L    Troponin-I, QT 0.15 (H) 0.0 - 0.045 NG/ML   MAGNESIUM    Collection Time: 11/18/21 12:20 AM   Result Value Ref Range    Magnesium 1.9 1.6 - 2.6 mg/dL   D DIMER    Collection Time: 11/18/21 12:20 AM   Result Value Ref Range    D DIMER 0.54 (H) <0.46 ug/ml(FEU)   SARS-COV-2    Collection Time: 11/18/21 12:25 AM   Result Value Ref Range    SARS-CoV-2 Nasopharyngeal     INFLUENZA A & B AG (RAPID TEST)    Collection Time: 11/18/21 12:25 AM   Result Value Ref Range    Influenza A Antigen Negative NEG      Influenza B Antigen Negative NEG     CARDIAC PANEL,(CK, CKMB & TROPONIN)    Collection Time: 11/18/21  3:45 AM   Result Value Ref Range    CK - MB <1.0 <3.6 ng/ml    CK-MB Index  0.0 - 4.0 %     CALCULATION NOT PERFORMED WHEN RESULT IS BELOW LINEAR LIMIT    CK 68 26 - 192 U/L    Troponin-I, QT 0.17 (H) 0.0 - 0.045 NG/ML   COVID-19 RAPID TEST    Collection Time: 11/18/21  3:51 AM   Result Value Ref Range    Specimen source Nasopharyngeal      COVID-19 rapid test Not detected NOTD           Radiologic Studies -   CTA CHEST W OR W WO CONT   Final Result      1. No pulmonary embolism identified. 2. Borderline cardiomegaly with interstitial edema and trace right pleural   effusion. XR CHEST PA LAT   Final Result      Borderline cardiomegaly with questionable interstitial edema.               Medical Decision Making   I am the first provider for this patient. I reviewed the vital signs, available nursing notes, past medical history, past surgical history, family history and social history. Vital Signs-Reviewed the patient's vital signs. EKG: See ED course for my interpretation of EKG(s). Records Reviewed: Nursing Notes, Old Medical Records, Previous electrocardiograms, Previous Radiology Studies and Previous Laboratory Studies (Time of Review: 11:10 PM)      Provider Notes (Medical Decision Making):   MDM  Number of Diagnoses or Management Options  Acute congestive heart failure, unspecified heart failure type ([address]  Troponin I above reference range  Diagnosis management comments: Patient is a 58-year-old female who presents to the ED with complaints of cough and shortness of breath. She is mainly concerned with her shortness of breath. On exam her lungs are grossly clear. I do not hear much in the way of wheezing or rails. She is quite hypertensive and tachycardic. She paints a picture that is very much infectious in nature with preceding cough and myalgias. She could have Covid. This would raise a question for possibly pneumonia as well as pleural effusion given the hypertension, consider heart failure or volume overload. PE also considered but thought to be less likely given her overall clinical picture but will keep in the differential.  We will do a chest x-ray and some blood work if there is no clear cause elicited or the patient is not feeling better with bronchodilator therapy may need to evaluate further. Results reviewed and patient reassessed. D-dimer was minimally elevated, CTA does not show any PE, some small right-sided effusion as well as cardiomegaly and interstitial edema. Suspect this is likely due to congestive heart failure, likely due to uncontrolled hypertension. The PCR Covid was pending, rapid Covid sent. Patient BNP and troponin both elevated, outside of normal range.   This is also consistent with CHF. Also consider possibly myocarditis that she does have somewhat of an infectious picture, although she does not have any symptoms of pericarditis or any chest pain. Case discussed with hospitalist, Dr. Franck Hollingsworth who agrees with the admission plan. Patient was in agreement as well. ED Course: Progress Notes, Reevaluation, and Consults:  ED Course as of 11/18/21 0513   u Nov 18, 2021   0123 EKG interpretation of 11/18/2021, 0010. Sinus tachycardia rate of 114 normal axis, normal intervals. Single PVC. No ST elevation or depression. T wave and ST segment flattening in leads I, aVL, III and aVF. Overall sinus tachycardia with nonspecific changes. [DUNCAN]   2596 Patient reassessed, she is resting comfortably, persistently tachycardic, D-dimer technically could rule her out for PE however given her tachycardia and modest elevation in cardiac enzymes will proceed with imaging to rule out PE. I suspect that she is either dealing with new onset of congestive heart failure or possibly PE. Given absence of any ongoing chest pain will hold off on heparinized the patient will does give her aspirin for now. [DUNCAN]   0138 Troponin-I, Qt.(!): 0.15 [DUNCAN]   0138 D DIMER(!): 0.54 [DUNCAN]   0138 NT pro-BNP(!): 6,793 [DUNCAN]   0352 Case was discussed with the hospitalist at, Dr. Franck Hollingsworth, who is in agreement with the plan for admission. [DUNCAN]      ED Course User Index  [DUNCAN] Julio Beltran,        Procedures    Critical Care Time: CRITICAL CARE NOTE:    I have spent 31 minutes of critical care time involved in lab review, consultations with specialist, family decision-making, and documentation. During this entire length of time I was immediately available to the patient. Critical Care:   The reason for providing this level of medical care for this critically ill patient was due a critical illness that impaired one or more vital organ systems such that there was a high probability of imminent or life threatening deterioration in the patients condition. This care involved high complexity decision making to assess, manipulate, and support vital system functions, to treat this vital organ system failure and to prevent further life threatening deterioration of the patients condition. Time is exclusive of procedural and teaching time. Mariam Greene DO'    Core Measures:  For Hospitalized Patients:    1. Hospitalization Decision Time:  The decision to hospitalize the patient was made by Dr Kendall Carvajal at 2992 on 11/17/2021    2. Aspirin: Aspirin was given    4:13 AM  Patient is being admitted to the hospital by Dr. Clay Schmidt. The results of their tests and reasons for their admission have been discussed with them and/or available family. They convey agreement and understanding for the need to be admitted and for their admission diagnosis. CONDITIONS ON ADMISSION:  Sepsis is not present at the time of admission. Deep Vein Thrombosis is not present at the time of admission. Thrombosis is not present at the time of admission. Urinary Tract Infection is not present at the time of admission. Pneumonia is not present at the time of admission. MRSA is not present at the time of admission. Wound infection is not present at the time of admission. Pressure Ulcer is not present at the time of admission. CLINICAL IMPRESSION:    1. Acute congestive heart failure, unspecified heart failure type ([address]    2. Troponin I above reference range          Diagnosis     Clinical Impression:   1. Acute congestive heart failure, unspecified heart failure type ([address]    2. Troponin I above reference range        Disposition: Admit    Follow-up Information    None          Current Discharge Medication List      CONTINUE these medications which have NOT CHANGED    Details   hydrALAZINE (APRESOLINE) 10 mg tablet Take 10 mg by mouth daily. Indications: high blood pressure      amLODIPine (NORVASC) 10 mg tablet Take 1 Tab by mouth daily.   Qty: 30 Tab, Refills: Johnathan [address]   Emergency Medicine   November 18, 2021, 11:10 PM     This note is dictated utilizing Dragon voice recognition software. Unfortunately this leads to occasional typographical errors using the voice recognition. I apologize in advance if the situation occurs. If questions occur please do not hesitate to contact me directly.     Olga Aranda, DO

## 2021-11-18 NOTE — H&P
History & Physical    Patient: Maggie Paul MRN: [id number]  CSN: [id number]    YOB: 1965  Age: 63 y.o. Sex: female      DOA: 11/17/2021  Primary Care Provider:  Vania Perez MD    Assessment/Plan   14-year-old woman with a history of hypertension it appears as if it may have been largely uncontrolled at home presents to the emergency department complaint of a 2-day history of worsening cough and shortness of breath. Admitted for what appears to be new onset congestive heart failure secondary to hypertensive disease. New onset congestive heart failure with elevated proBNP of greater than 6700  Uncontrolled hypertension  Mildly elevated D-dimer  Elevated troponin  Exposure to second-hand smoke    Review of lab work and scans reveals a CTA chest with and without contrast that shows borderline cardiomegaly with interstitial edema and trace right pleural effusion. Again her proBNP is greatly elevated greater than 6700. Initial elevated troponin of 0.15, second 1 is in process of being repeated. Admitted to telemetry unit for cardiac monitoring  Patient is already received 20 mg of IV Lasix, will order q12 need Lasix  Patient has received 324 mg of aspirin in the ED    Rapid Covid test was obtained and pending results  DuoNeb breathing treatment given in the ED    Dietary sodium restriction and fluid restriction  Echocardiogram in a.m.   Cardiology consult later this morning  Trend cardiac enzymes  Is possible that elevated troponin is due to strain of demand, will start Lovenox 1 mg/kg as precaution and defer continuing to cardiology    Had prior history of developing acute renal failure approximately 5 years ago and taken ACE inhibitor was discontinued so that we will not restart during this inpatient stay    We will add beta-blocker for rate control and blood pressure control    Needs to immediately stop secondhand smoke exposure, advised patient and her family members need to quit smoking for least smoke outside and away from her, would likely benefit from outpatient pulmonology follow-up to assess for possible early emphysema/COPD    DVT prophylaxis covered by Lovenox  GI prophylaxis covered by Pepcid    Code status: full     Patient Active Problem List   Diagnosis Code    Hypertension, accelerated I10    New onset of congestive heart failure ([facility] [address] I50.9    Elevated troponin R77.8    Exposure to second hand smoke Z77.22     Estimated length of stay : 2-4 days     CC: Cough and shortness of breath       HPI:     Sherwin Hurd is a 63 y.o. female   with a history of hypertension that appears as if it may have been largely uncontrolled at home, presents to emergency department complaining of a 2 to 3-day history of cough, shortness of breath with the cough being productive of clear sputum. She denies chest pain, fever, chills, night sweats, nausea, vomiting or diarrhea. Her symptoms appear to be progressively worsening. She reported to the ED team that she has sick contacts with her grandchildren but they were diagnosed with croup. She is a non-smoker, denies any history of lung disease like asthma, COPD or emphysema. She is also never had a PE or MI either. In emergency room when she presented she was tachypneic with a breathing rate in the 30s, blood pressure was quite elevated 180s over 100s, however she was not hypoxic. He was also tachycardic in the 110s and heart rate got as high as 120. She has been on still in the area of entire time and has not required any oxygen. Patient herself does not smoke however she lives with her significant other who has smoked in the house for the past 9 years and her adult daughter who also lives in the house also smokes in the house.      Past Medical History:   Diagnosis Date    Acute renal failure (ARF) ([facility] [address] 2/28/2016    Hypertension        Past Surgical History:   Procedure Laterality Date    HX GYN      hysterectomy       No family history on file. Social History     Socioeconomic History    Marital status: SINGLE   Tobacco Use    Smoking status: Never Smoker   Substance and Sexual Activity    Alcohol use: No    Drug use: No       Prior to Admission medications    Medication Sig Start Date End Date Taking? Authorizing Provider   hydrALAZINE (APRESOLINE) 10 mg tablet Take 10 mg by mouth daily. Indications: high blood pressure   Yes Provider, Historical   amLODIPine (NORVASC) 10 mg tablet Take 1 Tab by mouth daily. 3/1/16   Madeleine Naik MD       No Known Allergies    Review of Systems  Gen: No fever, chills, malaise, weight loss/gain. Heent: No headache, rhinorrhea, epistaxis, ear pain, hearing loss, sinus pain, neck pain/stiffness, sore throat. Heart: No chest pain, palpitations, JEAN-[place]TISTE, pnd, or orthopnea. Resp: +cough, no hemoptysis, wheezing, +shortness of breath. GI: No nausea, vomiting, diarrhea, constipation, melena or hematochezia. : No urinary obstruction, dysuria or hematuria. Derm: No rash, new skin lesion or pruritis. Musc/skeletal: no bone or joint complains. Vasc: No edema, cyanosis or claudication. Endo: No heat/cold intolerance, no polyuria,polydipsia or polyphagia. Neuro: No unilateral weakness, numbness, tingling. No seizures. Heme: No easy bruising or bleeding. Physical Exam:     Physical Exam:  Visit Vitals  BP (!) 163/101   Pulse (!) 107   Temp 97.9 °F (36.6 °C)   Resp 24   Ht 5' 5\" (1.651 m)   Wt 94.9 kg (209 lb 3.5 oz)   SpO2 99%   BMI 34.82 kg/m²      O2 Device: None (Room air)    Temp (24hrs), Av.9 °F (36.6 °C), Min:97.9 °F (36.6 °C), Max:97.9 °F (36.6 °C)    No intake/output data recorded. No intake/output data recorded. General:  Awake, cooperative, no distress. Head:  Normocephalic, without obvious abnormality, atraumatic. Eyes:  Conjunctivae/corneas clear, sclera anicteric, PERRL, EOMs intact. Nose: Nares normal. No drainage or sinus tenderness.    Throat: Lips, mucosa, and tongue normal.    Neck: Supple, symmetrical, trachea midline, no adenopathy. Lungs:   Clear to auscultation bilaterally. Heart:  Regular rate and rhythm, S1, S2 normal, no murmur, click, rub or gallop. Abdomen: Soft, non-tender. Bowel sounds normal. No masses,  No organomegaly. Extremities: Extremities normal, atraumatic, no cyanosis or edema. Capillary refill normal.   Pulses: 2+ and symmetric all extremities. Skin: Skin color as per ethnicity , turgor normal. No rashes or lesions   Neurologic: CNII-XII intact. No focal motor or sensory deficit. Labs Reviewed:      Recent Results (from the past 24 hour(s))   EKG, 12 LEAD, INITIAL    Collection Time: 11/18/21 12:10 AM   Result Value Ref Range    Ventricular Rate 114 BPM    Atrial Rate 114 BPM    P-R Interval 170 ms    QRS Duration 94 ms    Q-T Interval 330 ms    QTC Calculation (Bezet) 454 ms    Calculated P Axis 27 degrees    Calculated R Axis -13 degrees    Calculated T Axis 21 degrees    Diagnosis       Sinus tachycardia with occasional premature ventricular complexes  Minimal voltage criteria for LVH, may be normal variant ( [employer] product )  Borderline ECG  When compared with ECG of 28-FEB-2016 02:58,  premature ventricular complexes are now present     CBC WITH AUTOMATED DIFF    Collection Time: 11/18/21 12:20 AM   Result Value Ref Range    WBC 10.3 4.6 - 13.2 K/uL    RBC 4.71 4.20 - 5.30 M/uL    HGB 12.5 12.0 - 16.0 g/dL    HCT 40.0 35.0 - 45.0 %    MCV 84.9 78.0 - 100.0 FL    MCH 26.5 24.0 - 34.0 PG    MCHC 31.3 31.0 - 37.0 g/dL    RDW 14.3 11.6 - 14.5 %    PLATELET 590 401 - 908 K/uL    MPV 10.6 9.2 - 11.8 FL    NRBC 0.0 0  WBC    ABSOLUTE NRBC 0.00 0.00 - 0.01 K/uL    NEUTROPHILS 73 40 - 73 %    LYMPHOCYTES 18 (L) 21 - 52 %    MONOCYTES 6 3 - 10 %    EOSINOPHILS 2 0 - 5 %    BASOPHILS 1 0 - 2 %    IMMATURE GRANULOCYTES 0 0.0 - 0.5 %    ABS. NEUTROPHILS 7.5 1.8 - 8.0 K/UL    ABS. LYMPHOCYTES 1.9 0.9 - 3.6 K/UL    ABS. MONOCYTES 0.6 0.05 - 1.2 K/UL    ABS. EOSINOPHILS 0.2 0.0 - 0.4 K/UL    ABS. BASOPHILS 0.1 0.0 - 0.1 K/UL    ABS. IMM. GRANS. 0.0 0.00 - 0.04 K/UL    DF AUTOMATED     METABOLIC PANEL, COMPREHENSIVE    Collection Time: 11/18/21 12:20 AM   Result Value Ref Range    Sodium 139 136 - 145 mmol/L    Potassium 3.8 3.5 - 5.5 mmol/L    Chloride 109 100 - 111 mmol/L    CO2 24 21 - 32 mmol/L    Anion gap 6 3.0 - 18 mmol/L    Glucose 100 (H) 74 - 99 mg/dL    BUN 16 7.0 - 18 MG/DL    Creatinine 1.10 0.6 - 1.3 MG/DL    BUN/Creatinine ratio 15 12 - 20      GFR est AA >60 >60 ml/min/1.73m2    GFR est non-AA 51 (L) >60 ml/min/1.73m2    Calcium 9.1 8.5 - 10.1 MG/DL    Bilirubin, total 0.5 0.2 - 1.0 MG/DL    ALT (SGPT) 24 13 - 56 U/L    AST (SGOT) 17 10 - 38 U/L    Alk.  phosphatase 114 45 - 117 U/L    Protein, total 7.2 6.4 - 8.2 g/dL    Albumin 3.5 3.4 - 5.0 g/dL    Globulin 3.7 2.0 - 4.0 g/dL    A-G Ratio 0.9 0.8 - 1.7     NT-PRO BNP    Collection Time: 11/18/21 12:20 AM   Result Value Ref Range    NT pro-BNP 6,793 (H) 0 - 900 PG/ML   CARDIAC PANEL,(CK, CKMB & TROPONIN)    Collection Time: 11/18/21 12:20 AM   Result Value Ref Range    CK - MB <1.0 <3.6 ng/ml    CK-MB Index  0.0 - 4.0 %     CALCULATION NOT PERFORMED WHEN RESULT IS BELOW LINEAR LIMIT    CK 83 26 - 192 U/L    Troponin-I, QT 0.15 (H) 0.0 - 0.045 NG/ML   MAGNESIUM    Collection Time: 11/18/21 12:20 AM   Result Value Ref Range    Magnesium 1.9 1.6 - 2.6 mg/dL   D DIMER    Collection Time: 11/18/21 12:20 AM   Result Value Ref Range    D DIMER 0.54 (H) <0.46 ug/ml(FEU)   SARS-COV-2    Collection Time: 11/18/21 12:25 AM   Result Value Ref Range    SARS-CoV-2 Nasopharyngeal     INFLUENZA A & B AG (RAPID TEST)    Collection Time: 11/18/21 12:25 AM   Result Value Ref Range    Influenza A Antigen Negative NEG      Influenza B Antigen Negative NEG     CARDIAC PANEL,(CK, CKMB & TROPONIN)    Collection Time: 11/18/21  3:45 AM   Result Value Ref Range    CK - MB <1.0 <3.6 ng/ml CK-MB Index  0.0 - 4.0 %     CALCULATION NOT PERFORMED WHEN RESULT IS BELOW LINEAR LIMIT    CK 68 26 - 192 U/L    Troponin-I, QT 0.17 (H) 0.0 - 0.045 NG/ML   COVID-19 RAPID TEST    Collection Time: 11/18/21  3:51 AM   Result Value Ref Range    Specimen source Nasopharyngeal      COVID-19 rapid test Not detected NOTD     TSH 3RD GENERATION    Collection Time: 11/18/21  5:38 AM   Result Value Ref Range    TSH 0.58 0.36 - 3.74 uIU/mL   CBC W/O DIFF    Collection Time: 11/18/21  5:38 AM   Result Value Ref Range    WBC 9.3 4.6 - 13.2 K/uL    RBC 4.51 4.20 - 5.30 M/uL    HGB 12.0 12.0 - 16.0 g/dL    HCT 38.8 35.0 - 45.0 %    MCV 86.0 78.0 - 100.0 FL    MCH 26.6 24.0 - 34.0 PG    MCHC 30.9 (L) 31.0 - 37.0 g/dL    RDW 14.5 11.6 - 14.5 %    PLATELET 968 842 - 136 K/uL    MPV 10.1 9.2 - 11.8 FL    NRBC 0.0 0  WBC    ABSOLUTE NRBC 0.00 0.00 - 0.01 K/uL       Procedures/imaging: see electronic medical records for all procedures/Xrays and details which were not copied into this note but were reviewed prior to creation of Plan      CC: Ping Bright MD

## 2021-11-19 ENCOUNTER — APPOINTMENT (OUTPATIENT)
Dept: NON INVASIVE DIAGNOSTICS | Age: 56
DRG: 194 | End: 2021-11-19
Attending: INTERNAL MEDICINE
Payer: MEDICAID

## 2021-11-19 ENCOUNTER — APPOINTMENT (OUTPATIENT)
Dept: NUCLEAR MEDICINE | Age: 56
DRG: 194 | End: 2021-11-19
Attending: INTERNAL MEDICINE
Payer: MEDICAID

## 2021-11-19 LAB
ANION GAP SERPL CALC-SCNC: 7 MMOL/L (ref 3–18)
BUN SERPL-MCNC: 15 MG/DL (ref 7–18)
BUN/CREAT SERPL: 15 (ref 12–20)
CALCIUM SERPL-MCNC: 9.1 MG/DL (ref 8.5–10.1)
CHLORIDE SERPL-SCNC: 105 MMOL/L (ref 100–111)
CO2 SERPL-SCNC: 28 MMOL/L (ref 21–32)
CREAT SERPL-MCNC: 1.03 MG/DL (ref 0.6–1.3)
GLUCOSE SERPL-MCNC: 130 MG/DL (ref 74–99)
POTASSIUM SERPL-SCNC: 3.5 MMOL/L (ref 3.5–5.5)
SODIUM SERPL-SCNC: 140 MMOL/L (ref 136–145)
STRESS BASELINE HR: 93 BPM
STRESS ESTIMATED WORKLOAD: 1 METS
STRESS EXERCISE DUR MIN: NORMAL
STRESS PEAK DIAS BP: 91 MMHG
STRESS PEAK SYS BP: 149 MMHG
STRESS PERCENT HR ACHIEVED: 66 %
STRESS POST PEAK HR: 109 BPM
STRESS RATE PRESSURE PRODUCT: NORMAL BPM*MMHG
STRESS ST DEPRESSION: 0 MM
STRESS ST ELEVATION: 0 MM
STRESS TARGET HR: 164 BPM

## 2021-11-19 PROCEDURE — 74011250637 HC RX REV CODE- 250/637: Performed by: INTERNAL MEDICINE

## 2021-11-19 PROCEDURE — 80048 BASIC METABOLIC PNL TOTAL CA: CPT

## 2021-11-19 PROCEDURE — 36415 COLL VENOUS BLD VENIPUNCTURE: CPT

## 2021-11-19 PROCEDURE — 74011250636 HC RX REV CODE- 250/636: Performed by: FAMILY MEDICINE

## 2021-11-19 PROCEDURE — 74011250637 HC RX REV CODE- 250/637: Performed by: FAMILY MEDICINE

## 2021-11-19 PROCEDURE — 65660000000 HC RM CCU STEPDOWN

## 2021-11-19 PROCEDURE — 77010033678 HC OXYGEN DAILY

## 2021-11-19 PROCEDURE — 74011250636 HC RX REV CODE- 250/636: Performed by: INTERNAL MEDICINE

## 2021-11-19 PROCEDURE — 74011250636 HC RX REV CODE- 250/636: Performed by: HOSPITALIST

## 2021-11-19 PROCEDURE — 93017 CV STRESS TEST TRACING ONLY: CPT

## 2021-11-19 RX ORDER — FUROSEMIDE 40 MG/1
40 TABLET ORAL DAILY
Status: DISCONTINUED | OUTPATIENT
Start: 2021-11-20 | End: 2021-11-20 | Stop reason: HOSPADM

## 2021-11-19 RX ORDER — TETRAKIS(2-METHOXYISOBUTYLISOCYANIDE)COPPER(I) TETRAFLUOROBORATE 1 MG/ML
10.6 INJECTION, POWDER, LYOPHILIZED, FOR SOLUTION INTRAVENOUS
Status: COMPLETED | OUTPATIENT
Start: 2021-11-19 | End: 2021-11-19

## 2021-11-19 RX ORDER — METOPROLOL SUCCINATE 25 MG/1
25 TABLET, EXTENDED RELEASE ORAL
Status: COMPLETED | OUTPATIENT
Start: 2021-11-19 | End: 2021-11-19

## 2021-11-19 RX ORDER — METOPROLOL SUCCINATE 50 MG/1
50 TABLET, EXTENDED RELEASE ORAL DAILY
Status: DISCONTINUED | OUTPATIENT
Start: 2021-11-20 | End: 2021-11-20 | Stop reason: HOSPADM

## 2021-11-19 RX ORDER — TETRAKIS(2-METHOXYISOBUTYLISOCYANIDE)COPPER(I) TETRAFLUOROBORATE 1 MG/ML
32.7 INJECTION, POWDER, LYOPHILIZED, FOR SOLUTION INTRAVENOUS
Status: COMPLETED | OUTPATIENT
Start: 2021-11-19 | End: 2021-11-19

## 2021-11-19 RX ORDER — POTASSIUM CHLORIDE 20 MEQ/1
20 TABLET, EXTENDED RELEASE ORAL
Status: COMPLETED | OUTPATIENT
Start: 2021-11-19 | End: 2021-11-19

## 2021-11-19 RX ADMIN — POTASSIUM CHLORIDE 20 MEQ: 20 TABLET, EXTENDED RELEASE ORAL at 18:21

## 2021-11-19 RX ADMIN — HYDRALAZINE HYDROCHLORIDE 10 MG: 10 TABLET, FILM COATED ORAL at 15:25

## 2021-11-19 RX ADMIN — ISOSORBIDE DINITRATE 2.5 MG: 5 TABLET ORAL at 15:25

## 2021-11-19 RX ADMIN — ISOSORBIDE DINITRATE 2.5 MG: 5 TABLET ORAL at 08:55

## 2021-11-19 RX ADMIN — TETRAKIS(2-METHOXYISOBUTYLISOCYANIDE)COPPER(I) TETRAFLUOROBORATE 32.7 MILLICURIE: 1 INJECTION, POWDER, LYOPHILIZED, FOR SOLUTION INTRAVENOUS at 11:20

## 2021-11-19 RX ADMIN — Medication 10 ML: at 15:26

## 2021-11-19 RX ADMIN — METOPROLOL SUCCINATE 25 MG: 25 TABLET, EXTENDED RELEASE ORAL at 08:55

## 2021-11-19 RX ADMIN — ENOXAPARIN SODIUM 40 MG: 100 INJECTION SUBCUTANEOUS at 12:39

## 2021-11-19 RX ADMIN — HYDRALAZINE HYDROCHLORIDE 10 MG: 10 TABLET, FILM COATED ORAL at 21:03

## 2021-11-19 RX ADMIN — TETRAKIS(2-METHOXYISOBUTYLISOCYANIDE)COPPER(I) TETRAFLUOROBORATE 10.6 MILLICURIE: 1 INJECTION, POWDER, LYOPHILIZED, FOR SOLUTION INTRAVENOUS at 09:15

## 2021-11-19 RX ADMIN — METOPROLOL SUCCINATE 25 MG: 25 TABLET, EXTENDED RELEASE ORAL at 18:21

## 2021-11-19 RX ADMIN — FUROSEMIDE 20 MG: 10 INJECTION, SOLUTION INTRAMUSCULAR; INTRAVENOUS at 12:39

## 2021-11-19 RX ADMIN — REGADENOSON 0.4 MG: 0.08 INJECTION, SOLUTION INTRAVENOUS at 11:20

## 2021-11-19 RX ADMIN — ASPIRIN 81 MG: 81 TABLET, COATED ORAL at 08:55

## 2021-11-19 RX ADMIN — HYDRALAZINE HYDROCHLORIDE 10 MG: 10 TABLET, FILM COATED ORAL at 08:55

## 2021-11-19 NOTE — CONSULTS
[facility] Consult Note      Patient: Isaac Moyer MRN: [id number]  SSN: [id number]    YOB: 1965  Age: 63 y.o. Sex: female    Date of Consultation: 11/18/2021  Referring Physician: Roro Valdez MD  Reason for Consultation: CHF, abnormal troponin    Chief complain: Shortness of breath, Cough    HPI:  58-year-old female came to emergency room with complaining of worsening of shortness of breath for last few days and cough. She is complaining of shortness of breath on exertion and got worse over last few days. She also started having orthopnea. She denies any PND. She is complaining of cough. She denies any fever. She denies any chest pain. She denies any dizziness, palpitation, presyncope or syncope. She denies any smoking or alcohol abuse. She has hypertension but not taking medication regularly. She denies any family history of premature coronary artery disease. Cardiology consult called for evaluation of congestive heart failure and abnormal troponin.     Past Medical History:   Diagnosis Date    Acute renal failure (ARF) ([address] 2/28/2016    Hypertension      Past Surgical History:   Procedure Laterality Date    HX GYN      hysterectomy     Current Facility-Administered Medications   Medication Dose Route Frequency    ondansetron (ZOFRAN) injection 4 mg  4 mg IntraVENous Q6H PRN    acetaminophen (TYLENOL) tablet 650 mg  650 mg Oral Q6H PRN    nitroglycerin (NITROSTAT) tablet 0.4 mg  0.4 mg SubLINGual Q5MIN PRN    aspirin delayed-release tablet 81 mg  81 mg Oral DAILY    docusate sodium (COLACE) capsule 100 mg  100 mg Oral PRN    sodium chloride (NS) flush 5-40 mL  5-40 mL IntraVENous Q8H    sodium chloride (NS) flush 5-40 mL  5-40 mL IntraVENous PRN    acetaminophen (TYLENOL) tablet 650 mg  650 mg Oral Q6H PRN    Or    acetaminophen (TYLENOL) suppository 650 mg  650 mg Rectal Q6H PRN    polyethylene glycol (MIRALAX) packet 17 g  17 g Oral DAILY PRN    ondansetron (ZOFRAN ODT) tablet 4 mg  4 mg Oral Q8H PRN    Or    ondansetron (ZOFRAN) injection 4 mg  4 mg IntraVENous Q6H PRN    enoxaparin (LOVENOX) injection 90 mg  1 mg/kg SubCUTAneous Q12H    hydrALAZINE (APRESOLINE) tablet 10 mg  10 mg Oral TID    [START ON 11/19/2021] metoprolol succinate (TOPROL-XL) XL tablet 25 mg  25 mg Oral DAILY    furosemide (LASIX) injection 20 mg  20 mg IntraVENous Q12H    [START ON 11/19/2021] isosorbide dinitrate (ISORDIL) tablet 5 mg  5 mg Oral BID       Allergies and Intolerances:   No Known Allergies    Family History:   No family history on file. Social History:   She  reports that she has never smoked. She does not have any smokeless tobacco history on file. She  reports no history of alcohol use. Review of Systems:     Gen: No fever, chills, malaise, weight loss/gain. Heent: No headache, rhinorrhea, epistaxis, ear pain, hearing loss, sinus pain, neck pain/stiffness, sore throat. Heart: No chest pain, palpitations, pnd, Positive shortness of breath and orthopnea. Resp:   Positive cough, No hemoptysis, wheezing and dyspnea  GI: No nausea, vomiting, diarrhea, constipation, melena or hematochezia. : No urinary obstruction, dysuria or hematuria. Derm: No rash, new skin lesion or pruritis. Musc/skeletal: positive bone or joint complains. Vasc: No edema, cyanosis or claudication. Endo: No heat/cold intolerance, no polyuria,polydipsia or polyphagia. Neuro: No unilateral weakness, numbness, tingling. No seizures. Heme: No easy bruising or bleeding. Physical:   Patient Vitals for the past 6 hrs:   Temp Pulse Resp BP SpO2   11/18/21 2225 98.6 °F (37 °C) (!) 102 18 (!) 144/93 98 %   11/18/21 1904 98.2 °F (36.8 °C) (!) 101 20 (!) 146/91 99 %         Exam:   General Appearance: Comfortable, not using accessory muscles of respiration. HEENT: DIOMEDES. HEAD: Atraumatic  NECK: No JVD, no thyroidomeglay. CAROTIDS: No bruit  LUNGS: Clear bilaterally.    HEART: S1+S2 audible, no murmur, no pericardial rub. ABD: Non-tender, BS Audible    EXT: No edema, and no cyanosis. VASCULAR EXAM: Pulses are intact. PSYCHIATRIC EXAM: Mood is appropriate. MUSCULOSKELETAL: Grossly no joint deformity.   NEUROLOGICAL: AAO times 3, Motor and sensory sytem intact     Review of Data:   LABS:   Lab Results   Component Value Date/Time    WBC 9.3 11/18/2021 05:38 AM    HGB 12.0 11/18/2021 05:38 AM    HCT 38.8 11/18/2021 05:38 AM    PLATELET 038 87/44/5917 05:38 AM     Lab Results   Component Value Date/Time    Sodium 139 11/18/2021 12:20 AM    Potassium 3.8 11/18/2021 12:20 AM    Chloride 109 11/18/2021 12:20 AM    CO2 24 11/18/2021 12:20 AM    Glucose 100 (H) 11/18/2021 12:20 AM    BUN 16 11/18/2021 12:20 AM    Creatinine 1.10 11/18/2021 12:20 AM     Lab Results   Component Value Date/Time    Cholesterol, total 150 11/18/2021 05:38 AM    HDL Cholesterol 39 (L) 11/18/2021 05:38 AM    LDL, calculated 93.2 11/18/2021 05:38 AM    Triglyceride 89 11/18/2021 05:38 AM     No components found for: GPT  No results found for: HBA1C, [id number], [id number]      Cardiology Procedures:   Results for orders placed or performed during the hospital encounter of 11/17/21   EKG, 12 LEAD, INITIAL   Result Value Ref Range    Ventricular Rate 114 BPM    Atrial Rate 114 BPM    P-R Interval 170 ms    QRS Duration 94 ms    Q-T Interval 330 ms    QTC Calculation (Bezet) 454 ms    Calculated P Axis 27 degrees    Calculated R Axis -13 degrees    Calculated T Axis 21 degrees    Diagnosis       Poor baseline  Sinus tachycardia with occasional premature ventricular complexes  Minimal voltage criteria for LVH, may be normal variant ( [employer] product )  Nonspecific ST T changes  Abnormal ECG               Impression / Plan:    Patient Active Problem List   Diagnosis Code    Hypertension, accelerated I10    New onset of congestive heart failure (HCC) I50.9    Elevated troponin R77.8    Exposure to second hand smoke Z77.22     Acute decompensated systolic heart failure  Abnormal troponin no clear evidence of ACS could be from demand ischemia    CT chest reported    1. No pulmonary embolism identified.     2. Borderline cardiomegaly with interstitial edema and trace right pleural  Effusion. Echocardiogram revealed    · Left Ventricle: Normal cavity size and wall thickness. The estimated EF is 30 - 35%. Moderately reduced systolic function. There is moderate (grade 2) left ventricular diastolic dysfunction E/E'= 10.44. Wall Scoring: The left ventricular wall motion is globally hypokinetic. · Left Atrium: Mildly dilated left atrium. · Right Ventricle: Normal cavity size. Reduced systolic function. · Pulmonary Artery: Pulmonary arteries not well visualized. Pulmonary arterial systolic pressure (PASP) is 48 mmHg. Pulmonary hypertension not suggested by Doppler findings        Continue aspirin. Discontinue amlodipine and metoprolol tartrate. Start hydralazine 10 mg by mouth 3 times a day and isosorbide dinitrate 2.5 mg twice a day. Start metoprolol succinate 25 mg by mouth once a day. Continue IV Lasix. Change Lovenox to DVT prophylaxis dose. Strict input output. Salt restriction up to 2 g per day and fluid restriction up to 1.2 L per day   Advised about ischemia workup.     Scheduled for Lexiscan stress test.    NPO after midnight      Signed By: Katheryn Mireles MD     November 18, 2021

## 2021-11-19 NOTE — PROGRESS NOTES
0800-Received pt sitting up at edge of bed, no sign of distress, denies chest pain, ready for stress test  1100-Pt gone to stress test  1545-No sign of distress, vss, continues to deny chest pain  1907-Bedside and Verbal shift change report given to [address] (oncoming nurse) by Ghada Witt RN (offgoing nurse).  Report included the following information SBAR, Kardex, Intake/Output, MAR, Recent Results and Cardiac Rhythm SR.

## 2021-11-19 NOTE — PROGRESS NOTES
Cardiology Progress Note        Patient: Mario Akhtar        Sex: female          DOA: 11/17/2021  YOB: 1965      Age:  63 y.o.        LOS:  LOS: 1 day   Assessment/Plan     Patient Active Problem List   Diagnosis Code    Hypertension, accelerated I10    New onset of congestive heart failure ([facility] [address] I50.9    Elevated troponin R77.8    Exposure to second hand smoke Z77.22      Acute decompensated systolic heart failure  Abnormal troponin no clear evidence of ACS could be from demand ischemia     CT chest reported     1.  No pulmonary embolism identified.     2. Borderline cardiomegaly with interstitial edema and trace right pleural  Effusion.     Echocardiogram revealed     · Left Ventricle: Normal cavity size and wall thickness. The estimated EF is 30 - 35%. Moderately reduced systolic function. There is moderate (grade 2) left ventricular diastolic dysfunction E/E'= 10.44. Wall Scoring: The left ventricular wall motion is globally hypokinetic. · Left Atrium: Mildly dilated left atrium. · Right Ventricle: Normal cavity size. Reduced systolic function. · Pulmonary Artery: Pulmonary arteries not well visualized. Pulmonary arterial systolic pressure (PASP) is 48 mmHg. Pulmonary hypertension not suggested by Doppler findings      Stress test is negative for ischemia      telemetry monitor revealed frequent PVCs     Plan:    Continue aspirin. Continue hydralazine 10 mg by mouth 3 times a day and isosorbide dinitrate 2.5 mg twice a day. Increase metoprolol succinate to 50 mg by mouth once a day. Continue Lasix. Give dose of KCL  Will add aldactone as out patient   Strict input output. Salt restriction up to 2 g per day and fluid restriction up to 1.2 L per day   Patient can be discharged tomorrow morning if clinically stable   follow-up in cardiology clinic in 2 weeks.     Advised to follow-up with primary care provider in 3 days    Advised about medication adherence       Plan discussed with patient and she verbally understood      Subjective:    cc: My breathing is better today      REVIEW OF SYSTEMS:     General: No fevers or chills. Cardiovascular: No chest pain,No palpitations, No orthopnea, No PND, No leg swelling, No claudication  Pulmonary: No shortness of breath. Gastrointestinal: No nausea, vomiting, bleeding  Neurology: No Dizziness    Objective:      Visit Vitals  /71   Pulse 87   Temp 97.6 °F (36.4 °C)   Resp 13   Ht 5' 5\" (1.651 m)   Wt 93.9 kg (207 lb)   SpO2 99%   BMI 34.45 kg/m²     Body mass index is 34.45 kg/m². Physical Exam:  General Appearance: Comfortable, not using accessory muscles of respiration. HEENT: DIOMEDES. HEAD: Atraumatic  NECK: No JVD, no thyroidomeglay. CAROTIDS: no bruit  LUNGS: Clear bilaterally. HEART: S1+S2 audible, no murmur, no pericardial rub. ABD: Non-tender, BS Audible    EXT: No edema, and no cyanosis. VASCULAR EXAM: Pulses are intact. PSYCHIATRIC EXAM: Mood is appropriate. MUSCULOSKELETAL: Grossly no joint deformity.   NEUROLOGICAL: AAO times 3, No motor and sensory deficit    Medication:  Current Facility-Administered Medications   Medication Dose Route Frequency    [START ON 11/20/2021] furosemide (LASIX) tablet 40 mg  40 mg Oral DAILY    [START ON 11/20/2021] metoprolol succinate (TOPROL-XL) XL tablet 50 mg  50 mg Oral DAILY    metoprolol succinate (TOPROL-XL) XL tablet 25 mg  25 mg Oral CARD ONCE    nitroglycerin (NITROSTAT) tablet 0.4 mg  0.4 mg SubLINGual Q5MIN PRN    aspirin delayed-release tablet 81 mg  81 mg Oral DAILY    docusate sodium (COLACE) capsule 100 mg  100 mg Oral PRN    sodium chloride (NS) flush 5-40 mL  5-40 mL IntraVENous Q8H    sodium chloride (NS) flush 5-40 mL  5-40 mL IntraVENous PRN    acetaminophen (TYLENOL) tablet 650 mg  650 mg Oral Q6H PRN    Or    acetaminophen (TYLENOL) suppository 650 mg  650 mg Rectal Q6H PRN    polyethylene glycol (MIRALAX) packet 17 g 17 g Oral DAILY PRN    ondansetron (ZOFRAN ODT) tablet 4 mg  4 mg Oral Q8H PRN    Or    ondansetron (ZOFRAN) injection 4 mg  4 mg IntraVENous Q6H PRN    hydrALAZINE (APRESOLINE) tablet 10 mg  10 mg Oral TID    enoxaparin (LOVENOX) injection 40 mg  40 mg SubCUTAneous Q24H    isosorbide dinitrate (ISORDIL) tablet 2.5 mg  2.5 mg Oral BID               Lab/Data Reviewed:       Recent Labs     11/18/21  0538 11/18/21  0020   WBC 9.3 10.3   HGB 12.0 12.5   HCT 38.8 40.0    344     Recent Labs     11/19/21  0115 11/18/21  0020    139   K 3.5 3.8    109   CO2 28 24   * 100*   BUN 15 16   CREA 1.03 1.10   CA 9.1 9.1       Signed By: Dorothey Ganser, MD     November 19, 2021

## 2021-11-19 NOTE — PROGRESS NOTES
Bedside and Verbal shift change report given to TERRY Wright RN (oncoming nurse) by JIMI Patel RN (offgoing nurse). Report included the following information SBAR, Kardex, Intake/Output, MAR and Recent Results.

## 2021-11-19 NOTE — PROGRESS NOTES
Hospitalist Progress Note    Patient: Deshaun Meadows MRN: [id number]  CSN: [id number]    YOB: 1965  Age: 63 y.o. Sex: female    DOA: 11/17/2021 LOS:  LOS: 1 day          Chief Complaint:    CHF      Assessment/Plan        43-year-old woman with a history of hypertension it appears as if it may have been largely uncontrolled at home presents to the emergency department complaint of a 2-day history of worsening cough and shortness of breath.  Admitted for  new onset CHF     New onset congestive heart failure with elevated proBNP of greater than 6700  Uncontrolled hypertension  Mildly elevated D-dimer  Elevated troponin  Exposure to second-hand smoke     Echo shows systolic CHF with depressed EF    lexiscan stress test today     BP coming under better control     Cardiology consult    PO lasix    Labs reviewed     DVT prophylaxis     Code status: full   Disposition :  Patient Active Problem List   Diagnosis Code    Hypertension, accelerated I10    New onset of congestive heart failure ([facility] [address] I50.9    Elevated troponin R77.8    Exposure to second hand smoke Z77.22       Subjective:    I feel fine  No SOB  Walking easily to bathroom  No chest pains    Review of systems:    Respiratory: denies SOB, cough  Cardiovascular: denies chest pain, palpitations        Vital signs/Intake and Output:  Visit Vitals  /88   Pulse 95   Temp 98 °F (36.7 °C)   Resp 15   Ht 5' 5\" (1.651 m)   Wt 93.9 kg (207 lb)   SpO2 99%   BMI 34.45 kg/m²     Current Shift:  No intake/output data recorded.   Last three shifts:  11/17 1901 - 11/19 0700  In: 480 [P.O.:480]  Out: -     Exam:    General: Well developed, alert, NAD, OX3  CVS:Regular rate and rhythm, no M/R/G, S1/S2 heard, no thrill  Lungs:Clear to auscultation bilaterally, no wheezes, rhonchi, or rales  Extremities: No C/C/E, pulses palpable 2+  Neuro:grossly normal , follows commands  Psych:appropriate                Labs: Results:       Chemistry Recent Labs 11/19/21  0115 11/18/21  0020   * 100*    139   K 3.5 3.8    109   CO2 28 24   BUN 15 16   CREA 1.03 1.10   CA 9.1 9.1   AGAP 7 6   BUCR 15 15   AP  --  114   TP  --  7.2   ALB  --  3.5   GLOB  --  3.7   AGRAT  --  0.9      CBC w/Diff Recent Labs     11/18/21  0538 11/18/21  0020   WBC 9.3 10.3   RBC 4.51 4.71   HGB 12.0 12.5   HCT 38.8 40.0    344   GRANS  --  73   LYMPH  --  18*   EOS  --  2      Cardiac Enzymes Recent Labs     11/18/21  0345 11/18/21  0020   CPK 68 83   CKND1 CALCULATION NOT PERFORMED WHEN RESULT IS BELOW LINEAR LIMIT CALCULATION NOT PERFORMED WHEN RESULT IS BELOW LINEAR LIMIT      Coagulation No results for input(s): PTP, INR, APTT, INREXT in the last 72 hours. Lipid Panel Lab Results   Component Value Date/Time    Cholesterol, total 150 11/18/2021 05:38 AM    HDL Cholesterol 39 (L) 11/18/2021 05:38 AM    LDL, calculated 93.2 11/18/2021 05:38 AM    VLDL, calculated 17.8 11/18/2021 05:38 AM    Triglyceride 89 11/18/2021 05:38 AM    CHOL/HDL Ratio 3.8 11/18/2021 05:38 AM      BNP No results for input(s): BNPP in the last 72 hours.    Liver Enzymes Recent Labs     11/18/21  0020   TP 7.2   ALB 3.5         Thyroid Studies Lab Results   Component Value Date/Time    TSH 0.58 11/18/2021 05:38 AM        Procedures/imaging: see electronic medical records for all procedures/Xrays and details which were not copied into this note but were reviewed prior to creation of Lukas Gibbons MD

## 2021-11-20 VITALS
WEIGHT: 212 LBS | OXYGEN SATURATION: 100 % | BODY MASS INDEX: 35.32 KG/M2 | HEART RATE: 88 BPM | HEIGHT: 65 IN | TEMPERATURE: 97.4 F | RESPIRATION RATE: 16 BRPM | SYSTOLIC BLOOD PRESSURE: 132 MMHG | DIASTOLIC BLOOD PRESSURE: 91 MMHG

## 2021-11-20 LAB
ANION GAP SERPL CALC-SCNC: 6 MMOL/L (ref 3–18)
BUN SERPL-MCNC: 16 MG/DL (ref 7–18)
BUN/CREAT SERPL: 15 (ref 12–20)
CALCIUM SERPL-MCNC: 9 MG/DL (ref 8.5–10.1)
CHLORIDE SERPL-SCNC: 104 MMOL/L (ref 100–111)
CO2 SERPL-SCNC: 31 MMOL/L (ref 21–32)
CREAT SERPL-MCNC: 1.05 MG/DL (ref 0.6–1.3)
GLUCOSE SERPL-MCNC: 154 MG/DL (ref 74–99)
MAGNESIUM SERPL-MCNC: 2.2 MG/DL (ref 1.6–2.6)
POTASSIUM SERPL-SCNC: 4 MMOL/L (ref 3.5–5.5)
SODIUM SERPL-SCNC: 141 MMOL/L (ref 136–145)

## 2021-11-20 PROCEDURE — 36415 COLL VENOUS BLD VENIPUNCTURE: CPT

## 2021-11-20 PROCEDURE — 74011250637 HC RX REV CODE- 250/637: Performed by: INTERNAL MEDICINE

## 2021-11-20 PROCEDURE — 74011250637 HC RX REV CODE- 250/637: Performed by: FAMILY MEDICINE

## 2021-11-20 PROCEDURE — 74011250636 HC RX REV CODE- 250/636: Performed by: INTERNAL MEDICINE

## 2021-11-20 PROCEDURE — 80048 BASIC METABOLIC PNL TOTAL CA: CPT

## 2021-11-20 PROCEDURE — 83735 ASSAY OF MAGNESIUM: CPT

## 2021-11-20 PROCEDURE — 74011250637 HC RX REV CODE- 250/637: Performed by: HOSPITALIST

## 2021-11-20 RX ORDER — ISOSORBIDE DINITRATE 5 MG/1
2.5 TABLET ORAL 2 TIMES DAILY
Qty: 60 TABLET | Refills: 0 | Status: SHIPPED | OUTPATIENT
Start: 2021-11-20

## 2021-11-20 RX ORDER — FUROSEMIDE 40 MG/1
40 TABLET ORAL DAILY
Qty: 30 TABLET | Refills: 0 | Status: SHIPPED | OUTPATIENT
Start: 2021-11-20

## 2021-11-20 RX ORDER — NITROGLYCERIN 0.4 MG/1
0.4 TABLET SUBLINGUAL
Qty: 30 TABLET | Refills: 0 | Status: SHIPPED | OUTPATIENT
Start: 2021-11-20

## 2021-11-20 RX ORDER — ASPIRIN 81 MG/1
81 TABLET ORAL DAILY
Qty: 30 TABLET | Refills: 0 | Status: SHIPPED | OUTPATIENT
Start: 2021-11-21

## 2021-11-20 RX ORDER — METOPROLOL SUCCINATE 50 MG/1
50 TABLET, EXTENDED RELEASE ORAL DAILY
Qty: 30 TABLET | Refills: 0 | Status: SHIPPED | OUTPATIENT
Start: 2021-11-21

## 2021-11-20 RX ORDER — HYDRALAZINE HYDROCHLORIDE 10 MG/1
10 TABLET, FILM COATED ORAL 3 TIMES DAILY
Qty: 90 TABLET | Refills: 0 | Status: SHIPPED | OUTPATIENT
Start: 2021-11-20

## 2021-11-20 RX ADMIN — HYDRALAZINE HYDROCHLORIDE 10 MG: 10 TABLET, FILM COATED ORAL at 15:18

## 2021-11-20 RX ADMIN — ISOSORBIDE DINITRATE 2.5 MG: 5 TABLET ORAL at 15:18

## 2021-11-20 RX ADMIN — FUROSEMIDE 40 MG: 40 TABLET ORAL at 09:35

## 2021-11-20 RX ADMIN — ASPIRIN 81 MG: 81 TABLET, COATED ORAL at 09:35

## 2021-11-20 RX ADMIN — METOPROLOL SUCCINATE 50 MG: 50 TABLET, EXTENDED RELEASE ORAL at 09:36

## 2021-11-20 RX ADMIN — ENOXAPARIN SODIUM 40 MG: 100 INJECTION SUBCUTANEOUS at 09:35

## 2021-11-20 RX ADMIN — HYDRALAZINE HYDROCHLORIDE 10 MG: 10 TABLET, FILM COATED ORAL at 09:35

## 2021-11-20 RX ADMIN — ISOSORBIDE DINITRATE 2.5 MG: 5 TABLET ORAL at 09:36

## 2021-11-20 NOTE — DISCHARGE SUMMARY
Discharge Summary    Patient: José Miguel Briseno MRN: [id number]  CSN: [id number]    YOB: 1965  Age: 63 y.o. Sex: female    DOA: 11/17/2021 LOS:  LOS: 2 days   Discharge Date:      Primary Care Provider:  Brionna Knight MD    Admission Diagnoses: Acute CHF (congestive heart failure) ([facility] [I50.9]    Discharge Diagnoses:    Problem List as of 11/20/2021 Date Reviewed: 2/29/2016          Codes Class Noted - Resolved    * (Principal) New onset of congestive heart failure ([facility] ICD-10-CM: I50.9  ICD-9-CM: 428.0  11/18/2021 - Present        Elevated troponin ICD-10-CM: R77.8  ICD-9-CM: 790.6  11/18/2021 - Present        Exposure to second hand smoke ICD-10-CM: Z77.22  ICD-9-CM: V15.89  11/18/2021 - Present        Hypertension, accelerated ICD-10-CM: I10  ICD-9-CM: 401.0  2/28/2016 - Present        RESOLVED: Acute renal failure (ARF) ([facility] ICD-10-CM: N17.9  ICD-9-CM: 584.9  2/28/2016 - 11/18/2021        RESOLVED: Hypokalemia ICD-10-CM: E87.6  ICD-9-CM: 276.8  2/28/2016 - 11/18/2021        RESOLVED: Dehydration ICD-10-CM: E86.0  ICD-9-CM: 276.51  2/28/2016 - 11/18/2021        RESOLVED: Viral syndrome ICD-10-CM: B34.9  ICD-9-CM: 079.99  2/28/2016 - 11/18/2021              Discharge Medications:     Current Discharge Medication List      START taking these medications    Details   aspirin delayed-release 81 mg tablet Take 1 Tablet by mouth daily. Qty: 30 Tablet, Refills: 0  Start date: 11/21/2021      furosemide (LASIX) 40 mg tablet Take 1 Tablet by mouth daily. Qty: 30 Tablet, Refills: 0  Start date: 11/20/2021      isosorbide dinitrate (ISORDIL) 5 mg tablet Take 0.5 Tablets by mouth two (2) times a day. Indications: chronic heart failure  Qty: 60 Tablet, Refills: 0  Start date: 11/20/2021      metoprolol succinate (TOPROL-XL) 50 mg XL tablet Take 1 Tablet by mouth daily.   Qty: 30 Tablet, Refills: 0  Start date: 11/21/2021      nitroglycerin (NITROSTAT) 0.4 mg SL tablet 1 Tablet by SubLINGual route every five (5) minutes as needed for Chest Pain. Up to 3 doses. Indications: acute attack of angina  Qty: 30 Tablet, Refills: 0  Start date: 11/20/2021         CONTINUE these medications which have CHANGED    Details   hydrALAZINE (APRESOLINE) 10 mg tablet Take 1 Tablet by mouth three (3) times daily. Qty: 90 Tablet, Refills: 0  Start date: 11/20/2021         STOP taking these medications       amLODIPine (NORVASC) 10 mg tablet Comments:   Reason for Stopping:               Discharge Condition: Improved    Procedures : Stress test, see results below    Consults: Cardiology, Dr. Joe Sellers  BP (!) 132/91   Pulse 88   Temp 97.4 °F (36.3 °C)   Resp 16   Ht 5' 5\" (1.651 m)   Wt 96.2 kg (212 lb)   SpO2 100%   BMI 35.28 kg/m²     General: Awake, cooperative, no acute distress    HEENT: NC, Atraumatic. PERRLA, EOMI. Anicteric sclerae. Lungs:  CTA Bilaterally. No Wheezing/Rhonchi/Rales. Heart:  Regular  rhythm,  No murmur, No Rubs, No Gallops  Abdomen: Soft, Non distended, Non tender. +Bowel sounds,   Extremities: No c/c/e  Psych:   Not anxious or agitated. Neurologic:  No acute neurological deficits. Admission HPI : Ray Ireland is a 63 y.o. female   with a history of hypertension that appears as if it may have been largely uncontrolled at home, presents to emergency department complaining of a 2 to 3-day history of cough, shortness of breath with the cough being productive of clear sputum. She denies chest pain, fever, chills, night sweats, nausea, vomiting or diarrhea. Her symptoms appear to be progressively worsening. She reported to the ED team that she has sick contacts with her grandchildren but they were diagnosed with croup. She is a non-smoker, denies any history of lung disease like asthma, COPD or emphysema. She is also never had a PE or MI either.    In emergency room when she presented she was tachypneic with a breathing rate in the 30s, blood pressure was quite elevated 180s over 100s, however she was not hypoxic. He was also tachycardic in the 110s and heart rate got as high as 120. She has been on still in the area of entire time and has not required any oxygen. Patient herself does not smoke however she lives with her significant other who has smoked in the house for the past 9 years and her adult daughter who also lives in the house also smokes in the house. Hospital Course :   Acute decompensated systolic heart failure  Abnormal troponin no clear evidence of ACS could be from demand ischemia  Uncontrolled hypertension  Exposure to second-hand smoke    Echo: Left Ventricle: Normal cavity size and wall thickness. The estimated EF is 30 - 35%. Moderately reduced systolic function. There is moderate (grade 2) left ventricular diastolic dysfunction E/E'= 10.44. Wall Scoring: The left ventricular wall motion is globally hypokinetic    Stress test is negative for ischemia   Continue aspirin.    Continue hydralazine 10 mg by mouth 3 times a day and isosorbide dinitrate 2.5 mg twice a day.    Increase metoprolol succinate to 50 mg by mouth once a day.    Continue Lasix. Will add aldactone as out patient   Salt restriction up to 2 g per day and fluid restriction up to 1.2 L per day   Follow-up in cardiology clinic in 2 weeks.     Advised to follow-up with primary care provider in 3 days    Activity: As tolerated    Diet: Cardiac, low-fat, high-fiber, salt and fluid restriction, no more than 2 g of sodium per day and no more than 1.2 L of fluid per day    Follow-up: See above    Disposition: Home    Minutes spent on discharge: 45 minutes      Labs: Results:       Chemistry Recent Labs     11/20/21  0310 11/19/21  0115 11/18/21  0020   * 130* 100*    140 139   K 4.0 3.5 3.8    105 109   CO2 31 28 24   BUN 16 15 16   CREA 1.05 1.03 1.10   CA 9.0 9.1 9.1   AGAP 6 7 6   BUCR 15 15 15   AP  --   --  114   TP  --   --  7.2   ALB  -- --  3.5   GLOB  --   --  3.7   AGRAT  --   --  0.9      CBC w/Diff Recent Labs     11/18/21  0538 11/18/21  0020   WBC 9.3 10.3   RBC 4.51 4.71   HGB 12.0 12.5   HCT 38.8 40.0    344   GRANS  --  73   LYMPH  --  18*   EOS  --  2      Cardiac Enzymes Recent Labs     11/18/21  0345 11/18/21  0020   CPK 68 83   CKND1 CALCULATION NOT PERFORMED WHEN RESULT IS BELOW LINEAR LIMIT CALCULATION NOT PERFORMED WHEN RESULT IS BELOW LINEAR LIMIT      Coagulation No results for input(s): PTP, INR, APTT, INREXT, INREXT in the last 72 hours. Lipid Panel Lab Results   Component Value Date/Time    Cholesterol, total 150 11/18/2021 05:38 AM    HDL Cholesterol 39 (L) 11/18/2021 05:38 AM    LDL, calculated 93.2 11/18/2021 05:38 AM    VLDL, calculated 17.8 11/18/2021 05:38 AM    Triglyceride 89 11/18/2021 05:38 AM    CHOL/HDL Ratio 3.8 11/18/2021 05:38 AM      BNP No results for input(s): BNPP in the last 72 hours. Liver Enzymes Recent Labs     11/18/21  0020   TP 7.2   ALB 3.5         Thyroid Studies Lab Results   Component Value Date/Time    TSH 0.58 11/18/2021 05:38 AM            Significant Diagnostic Studies: XR CHEST PA LAT    Result Date: 11/17/2021  EXAM: CHEST RADIOGRAPHS CLINICAL INDICATION/HISTORY: SOB   > Additional: None COMPARISON: 2/28/2016 TECHNIQUE: Frontal and lateral views of the chest _______________ FINDINGS: HEART AND MEDIASTINUM: Borderline cardiomegaly. LUNGS AND PLEURAL SPACES: Mild coarse interstitial markings. No focal consolidation, effusion, or pneumothorax. BONES AND SOFT TISSUES: Unremarkable. _______________     Borderline cardiomegaly with questionable interstitial edema. CTA CHEST W OR W WO CONT    Result Date: 11/18/2021  EXAM: CTA chest CLINICAL INDICATION/HISTORY: Shortness of breath COMPARISON: Chest radiograph 11/17/2021 TECHNIQUE: Axial CT imaging from the thoracic inlet through the diaphragm with intravenous contrast. Coronal and sagittal MIP reformats were generated.  One or more dose reduction techniques were used on this CT: automated exposure control, adjustment of the mAs and/or kVp according to patient size, and iterative reconstruction techniques. The specific techniques used on this CT exam have been documented in the patient's electronic medical record. Digital Imaging and Communications in Medicine (DICOM) format image data are available to nonaffiliated external healthcare facilities or entities on a secured, media free, reciprocally searchable basis with patient authorization for at least a 12-month period after this study. _______________ FINDINGS: EXAM QUALITY: Adequate PULMONARY ARTERIES: No pulmonary embolism identified. MEDIASTINUM: Heart is borderline enlarged. Aorta is unremarkable. Trace pericardial effusion. LUNGS: Bilateral interlobular septal thickening and some mild groundglass density secondary to edema. No focal consolidation. No suspicious nodules or masses. AIRWAY: Peribronchial interstitial edema. PLEURA: Trace right pleural effusion. LYMPH NODES: No enlarged nodes. UPPER ABDOMEN: Unremarkable. BONES: No acute or aggressive osseous abnormalities identified. OTHER: None. _______________     1. No pulmonary embolism identified. 2. Borderline cardiomegaly with interstitial edema and trace right pleural effusion. ECHO ADULT COMPLETE    Result Date: 11/18/2021  · Left Ventricle: Normal cavity size and wall thickness. The estimated EF is 30 - 35%. Moderately reduced systolic function. There is moderate (grade 2) left ventricular diastolic dysfunction E/E'= 10.44. Wall Scoring: The left ventricular wall motion is globally hypokinetic. · Left Atrium: Mildly dilated left atrium. · Right Ventricle: Normal cavity size. Reduced systolic function. · Pulmonary Artery: Pulmonary arteries not well visualized. Pulmonary arterial systolic pressure (PASP) is 48 mmHg. Pulmonary hypertension not suggested by Doppler findings.       NUCLEAR CARDIAC STRESS TEST    Result Date: 11/19/2021  · Baseline ECG: Sinus rhythm, occasional PVCs, Nonspecific T wave changes . · Stress test: Stress EKG normal. · SPECT: Left ventricular function post-stress was abnormal. Calculated ejection fraction is 36% (normal EF value is equal to or greater than 50%). Left ventricular wall motion was abnormal at stress as described below in the wall scoring diagram. There is no evidence of transient ischemic dilation (TID). The TID ratio is 1.2. · SPECT: Left ventricular perfusion is normal.          No results found for this or any previous visit.         CC: Anette Henley MD

## 2021-11-20 NOTE — DISCHARGE INSTRUCTIONS
Patient Education        Learning About ACE Inhibitors  What are ACE inhibitors? ACE (angiotensin-converting enzyme) inhibitors are medicines that lower blood pressure. They stop the release of an enzyme. This enzyme makes your blood vessels smaller. Without it, your blood vessels relax and get bigger. This lowers your blood pressure. These medicines also increase how much water and salt go into your urine. This also lowers blood pressure. You may take this kind of medicine if you have high blood pressure. Or you may take it if you have heart problems, kidney problems, or diabetes. If you have coronary artery disease, this medicine can help prevent heart attacks and strokes. Examples  · benazepril (Lotensin)  · enalapril (Vasotec)  · lisinopril (Prinivil, Zestril)  · ramipril (Altace)  This is not a complete list.  Possible side effects  Side effects may include:  · A cough. · Low blood pressure. This can make you feel dizzy or weak. · Too much potassium in your body. · Swelling of your lips, tongue, or face. If the swelling is severe, you may need treatment right away. Severe swelling can make it hard to breathe, but this is rare. You may have other side effects or reactions not listed here. Check the information that comes with your medicine. What to know about taking this medicine  · ACE inhibitors can cause a dry cough. Talk to your doctor if you have a dry cough. You may need a different medicine. · These medicines can cause an allergic reaction. This can cause a little swelling. Or it can cause red bumps on your skin that hurt. In rare cases, the swelling may make it hard for you to breathe. · Do not take this medicine if you are pregnant or plan to become pregnant. · Take your medicines exactly as prescribed. Call your doctor if you think you are having a problem with your medicine. · Check with your doctor or pharmacist before you use any other medicines.  This includes over-the-counter medicines. Make sure your doctor knows all of the medicines, vitamins, herbal products, and supplements you take. Taking some medicines together can cause problems. · You may need regular blood tests. Where can you learn more? Go to http://www.gray.com/  Enter P050 in the search box to learn more about \"Learning About ACE Inhibitors. \"  Current as of: April 29, 2021               Content Version: 13.0  © 2006-2021 [employer]. Care instructions adapted under license by [employer] (which disclaims liability or warranty for this information). If you have questions about a medical condition or this instruction, always ask your healthcare professional. [address] any warranty or liability for your use of this information. Patient Education        Home Blood Pressure Test: About This Test  What is it? A home blood pressure test allows you to keep track of your blood pressure at home. Blood pressure is a measure of the force of blood against the walls of your arteries. Blood pressure readings include two numbers, such as 130/80 (say \"130 over 80\"). The first number is the systolic pressure. The second number is the diastolic pressure. Why is this test done? You may do this test at home to:  · Find out if you have high blood pressure. · Track your blood pressure if you have high blood pressure. · Track how well medicine is working to reduce high blood pressure. · Check how lifestyle changes, such as weight loss and exercise, are affecting blood pressure. How do you prepare for the test?  For at least 30 minutes before you take your blood pressure, don't exercise, drink caffeine, or smoke. Empty your bladder before the test. Sit quietly with your back straight and both feet on the floor for at least 5 minutes. This helps you take your blood pressure while you feel comfortable and relaxed. How is the test done?   · If your doctor recommends it, take your blood pressure twice a day. Take it in the morning and evening. · Sit with your arm slightly bent and resting on a table so that your upper arm is at the same level as your heart. · Use the same arm each time you take your blood pressure. · Place the blood pressure cuff on the bare skin of your upper arm. You may have to roll up your sleeve, remove your arm from the sleeve, or take your shirt off. · Wrap the blood pressure cuff around your upper arm so that the lower edge of the cuff is about 1 inch above the bend of your elbow. · Do not move, talk, or text while you take your blood pressure. Follow the instructions that came with your blood pressure monitor. They might be different from the following. · Press the on/off button on the automatic monitor. Then you may need to wait until the screen says the monitor is ready. · Press the start button. The cuff will inflate and deflate by itself. · Your blood pressure numbers will appear on the screen. · Wait one minute and take your blood pressure again. · If your monitor does not automatically save your numbers, write them in your log book, along with the date and time. Follow-up care is a key part of your treatment and safety. Be sure to make and go to all appointments, and call your doctor if you are having problems. It's also a good idea to keep a list of the medicines you take. Where can you learn more? Go to http://www.cooper.com/  Enter C427 in the search box to learn more about \"Home Blood Pressure Test: About This Test.\"  Current as of: April 29, 2021               Content Version: 13.0  © 7123-6460 Health[employer]. Care instructions adapted under license by [employer] (which disclaims liability or warranty for this information).  If you have questions about a medical condition or this instruction, always ask your healthcare professional. Daniela Alvarez any warranty or liability for your use of this information. Patient Education   Learning About Coronavirus [phone])  Coronavirus [phone]): Overview  What is coronavirus (IFFLB-88)? The coronavirus disease (COVID-19) is caused by a virus. It is an illness that was first found in [place], [place], in December 2019. It has since spread worldwide. The virus can cause fever, cough, and trouble breathing. In severe cases, it can cause pneumonia and make it hard to breathe without help. It can cause death. Coronaviruses are a large group of viruses. They cause the common cold. They also cause more serious illnesses like Middle East respiratory syndrome (MERS) and severe acute respiratory syndrome (SARS). COVID-19 is caused by a novel coronavirus. That means it's a new type that has not been seen in people before. This virus spreads person-to-person through droplets from coughing and sneezing. It can also spread when you are close to someone who is infected. And it can spread when you touch something that has the virus on it, such as a doorknob or a tabletop. What can you do to protect yourself from coronavirus (COVID-19)? The best way to protect yourself from getting sick is to:  · Avoid areas where there is an outbreak. · Avoid contact with people who may be infected. · Wash your hands often with soap or alcohol-based hand sanitizers. · Avoid crowds and try to stay at least 6 feet away from other people. · Wash your hands often, especially after you cough or sneeze. Use soap and water, and scrub for at least 20 seconds. If soap and water aren't available, use an alcohol-based hand . · Avoid touching your mouth, nose, and eyes. What can you do to avoid spreading the virus to others? To help avoid spreading the virus to others:  · Cover your mouth with a tissue when you cough or sneeze. Then throw the tissue in the trash. · Use a disinfectant to clean things that you touch often.   · Stay home if you are sick or have been exposed to the virus. Don't go to school, work, or public areas. And don't use public transportation. · If you are sick:  ? Leave your home only if you need to get medical care. But call the doctor's office first so they know you're coming. And wear a face mask, if you have one.  ? If you have a face mask, wear it whenever you're around other people. It can help stop the spread of the virus when you cough or sneeze. ? Clean and disinfect your home every day. Use household  and disinfectant wipes or sprays. Take special care to clean things that you grab with your hands. These include doorknobs, remote controls, phones, and handles on your refrigerator and microwave. And don't forget countertops, tabletops, bathrooms, and computer keyboards. When to call for help  Call 911 anytime you think you may need emergency care. For example, call if:  · You have severe trouble breathing. (You can't talk at all.)  · You have constant chest pain or pressure. · You are severely dizzy or lightheaded. · You are confused or can't think clearly. · Your face and lips have a blue color. · You pass out (lose consciousness) or are very hard to wake up. Call your doctor now if you develop symptoms such as:  · Shortness of breath. · Fever. · Cough. If you need to get care, call ahead to the doctor's office for instructions before you go. Make sure you wear a face mask, if you have one, to prevent exposing other people to the virus. Where can you get the latest information? The following health organizations are tracking and studying this virus. Their websites contain the most up-to-date information. Kade Vasquez also learn what to do if you think you may have been exposed to the virus. · U.S. Centers for Disease Control and Prevention (CDC): The CDC provides updated news about the disease and travel advice. The website also tells you how to prevent the spread of infection.  www.cdc.gov  · World Health Organization [facility]): WHO offers information about the virus outbreaks. WHO also has travel advice. www.who.int  Current as of: April 1, 2020               Content Version: 12.4  © 3004-4638 Healthwise, Incorporated. Care instructions adapted under license by your healthcare professional. If you have questions about a medical condition or this instruction, always ask your healthcare professional. [address] any warranty or liability for your use of this information.

## 2021-11-20 NOTE — PROGRESS NOTES
Bedside and Verbal shift change report given to OLEG Lewis (oncoming nurse) by JIMI Patel, RN (offgoing nurse). Report included the following information SBAR, Kardex, Intake/Output, MAR and Recent Results.

## 2021-11-20 NOTE — PROGRESS NOTES
0800 - Received pt sitting up on the side of the bed. Pt denied any pain. Stated she is ready to go home. 1200 - Pt asked when she would be seeing the hospitalist. Denied any pain or changes in health status.

## 2022-03-18 PROBLEM — Z77.22 EXPOSURE TO SECOND HAND SMOKE: Status: ACTIVE | Noted: 2021-11-18

## 2022-03-18 PROBLEM — R79.89 ELEVATED TROPONIN: Status: ACTIVE | Noted: 2021-11-18

## 2022-03-18 PROBLEM — R77.8 ELEVATED TROPONIN: Status: ACTIVE | Noted: 2021-11-18

## 2022-03-18 PROBLEM — I50.9 NEW ONSET OF CONGESTIVE HEART FAILURE (HCC): Status: ACTIVE | Noted: 2021-11-18

## 2022-05-23 ENCOUNTER — HOSPITAL ENCOUNTER (EMERGENCY)
Age: 57
Discharge: HOME | End: 2022-05-23
Attending: EMERGENCY MEDICINE
Payer: MEDICAID

## 2022-05-23 VITALS
RESPIRATION RATE: 18 BRPM | OXYGEN SATURATION: 100 % | TEMPERATURE: 97.7 F | HEART RATE: 95 BPM | DIASTOLIC BLOOD PRESSURE: 105 MMHG | SYSTOLIC BLOOD PRESSURE: 134 MMHG

## 2022-05-23 DIAGNOSIS — F32.A DEPRESSION, UNSPECIFIED DEPRESSION TYPE: Primary | ICD-10-CM

## 2022-05-23 LAB
ALBUMIN SERPL-MCNC: 3.7 G/DL (ref 3.4–5)
ALBUMIN/GLOB SERPL: 0.8 {RATIO} (ref 0.8–1.7)
ALP SERPL-CCNC: 121 U/L (ref 45–117)
ALT SERPL-CCNC: 19 U/L (ref 13–56)
ANION GAP SERPL CALC-SCNC: 5 MMOL/L (ref 3–18)
AST SERPL-CCNC: 16 U/L (ref 10–38)
BASOPHILS # BLD: 0.1 K/UL (ref 0–0.1)
BASOPHILS NFR BLD: 1 % (ref 0–2)
BILIRUB SERPL-MCNC: 0.5 MG/DL (ref 0.2–1)
BUN SERPL-MCNC: 15 MG/DL (ref 7–18)
BUN/CREAT SERPL: 14 (ref 12–20)
CALCIUM SERPL-MCNC: 9.5 MG/DL (ref 8.5–10.1)
CHLORIDE SERPL-SCNC: 106 MMOL/L (ref 100–111)
CO2 SERPL-SCNC: 28 MMOL/L (ref 21–32)
COVID-19 RAPID TEST, COVR: NOT DETECTED
CREAT SERPL-MCNC: 1.11 MG/DL (ref 0.6–1.3)
DIFFERENTIAL METHOD BLD: ABNORMAL
EOSINOPHIL # BLD: 0.2 K/UL (ref 0–0.4)
EOSINOPHIL NFR BLD: 2 % (ref 0–5)
ERYTHROCYTE [DISTWIDTH] IN BLOOD BY AUTOMATED COUNT: 14.5 % (ref 11.6–14.5)
ETHANOL SERPL-MCNC: <3 MG/DL (ref 0–3)
FLUAV RNA SPEC QL NAA+PROBE: NOT DETECTED
FLUBV RNA SPEC QL NAA+PROBE: NOT DETECTED
GLOBULIN SER CALC-MCNC: 4.4 G/DL (ref 2–4)
GLUCOSE SERPL-MCNC: 160 MG/DL (ref 74–99)
HCT VFR BLD AUTO: 43.1 % (ref 35–45)
HGB BLD-MCNC: 13.4 G/DL (ref 12–16)
IMM GRANULOCYTES # BLD AUTO: 0 K/UL (ref 0–0.04)
IMM GRANULOCYTES NFR BLD AUTO: 0 % (ref 0–0.5)
LYMPHOCYTES # BLD: 1.8 K/UL (ref 0.9–3.6)
LYMPHOCYTES NFR BLD: 20 % (ref 21–52)
MCH RBC QN AUTO: 25.9 PG (ref 24–34)
MCHC RBC AUTO-ENTMCNC: 31.1 G/DL (ref 31–37)
MCV RBC AUTO: 83.4 FL (ref 78–100)
MONOCYTES # BLD: 0.5 K/UL (ref 0.05–1.2)
MONOCYTES NFR BLD: 6 % (ref 3–10)
NEUTS SEG # BLD: 6.5 K/UL (ref 1.8–8)
NEUTS SEG NFR BLD: 71 % (ref 40–73)
NRBC # BLD: 0 K/UL (ref 0–0.01)
NRBC BLD-RTO: 0 PER 100 WBC
PLATELET # BLD AUTO: 262 K/UL (ref 135–420)
PMV BLD AUTO: 11.6 FL (ref 9.2–11.8)
POTASSIUM SERPL-SCNC: 3.4 MMOL/L (ref 3.5–5.5)
PROT SERPL-MCNC: 8.1 G/DL (ref 6.4–8.2)
RBC # BLD AUTO: 5.17 M/UL (ref 4.2–5.3)
SARS-COV-2, COV2: NOT DETECTED
SODIUM SERPL-SCNC: 139 MMOL/L (ref 136–145)
SOURCE, COVRS: NORMAL
WBC # BLD AUTO: 9.1 K/UL (ref 4.6–13.2)

## 2022-05-23 PROCEDURE — 85025 COMPLETE CBC W/AUTO DIFF WBC: CPT

## 2022-05-23 PROCEDURE — 80053 COMPREHEN METABOLIC PANEL: CPT

## 2022-05-23 PROCEDURE — 87635 SARS-COV-2 COVID-19 AMP PRB: CPT

## 2022-05-23 PROCEDURE — 82077 ASSAY SPEC XCP UR&BREATH IA: CPT

## 2022-05-23 PROCEDURE — 99285 EMERGENCY DEPT VISIT HI MDM: CPT

## 2022-05-23 PROCEDURE — 87636 SARSCOV2 & INF A&B AMP PRB: CPT

## 2022-05-23 NOTE — ED NOTES
Pt reports she was upset earlier and texted her sister saying she had been thinking of ways to harm herself. Her sister called pts daughters to have them bring her to ER. Pt denies giving her sister any plan for suicide even though she had told her she had thought of several ways to commit suicide. Pt endorses she had a sister who did committ suicide years ago. Pt reports she is better now and doesn't have those thoughts. Pt states she was stressed when she said it. Daughters are not present to confirm at this time. Spoke with Rosamaria at [facility] and she stated the the pt would have to call and speak with someone in member assistance to have the medication changed from a tier 3 to tier 2 herself. Called the [facility] pharmacy and spoke with Mandi to see if the insurance was just not covering the medications anymore and I was told that the insurance is still covering a big percent of it the price just went up. Tresiba is usually over $1000 and Novolog over $2000. LVM on the pt's phone explaining what was going on per the pt if she did not answer.

## 2022-05-23 NOTE — ED PROVIDER NOTES
EMERGENCY DEPARTMENT HISTORY AND PHYSICAL EXAM    Date: 2022  Patient Name: Ronni Ellis    History of Presenting Illness     Chief Complaint   Patient presents with   [address]         History Provided By: Patient    1:12 PM  Ronni Ellis is a 61 y.o. female with PMHX of hypertension, kidney disease, depression who presents to the emergency department C/O depression. Patient reports has been struggling with worsening depression over the past few weeks and this morning felt particularly overwhelmed. She reports she texted some family members that she was having thoughts of harming herself without a specific plan. She denies any HI or previous attempts at self-harm and states she would never really hurt herself because she has her grandchildren to live for. She states she just needs help getting plugged in with a doctor to get back on her depression medications and into therapy. She denies any prior psychiatric admissions. She denies have any weapons in her home. She does note that the sister  of suicide. Denies any substance abuse. PCP: Jenni Shah MD    Current Outpatient Medications   Medication Sig Dispense Refill    hydrALAZINE (APRESOLINE) 10 mg tablet Take 1 Tablet by mouth three (3) times daily. 90 Tablet 0    aspirin delayed-release 81 mg tablet Take 1 Tablet by mouth daily. 30 Tablet 0    furosemide (LASIX) 40 mg tablet Take 1 Tablet by mouth daily. 30 Tablet 0    isosorbide dinitrate (ISORDIL) 5 mg tablet Take 0.5 Tablets by mouth two (2) times a day. Indications: chronic heart failure 60 Tablet 0    metoprolol succinate (TOPROL-XL) 50 mg XL tablet Take 1 Tablet by mouth daily. 30 Tablet 0    nitroglycerin (NITROSTAT) 0.4 mg SL tablet 1 Tablet by SubLINGual route every five (5) minutes as needed for Chest Pain. Up to 3 doses.   Indications: acute attack of angina 30 Tablet 0       Past History       Past Medical History:  Past Medical History:   Diagnosis Date    Acute renal failure (ARF) ([facility] [address] 2/28/2016    Hypertension        Past Surgical History:  Past Surgical History:   Procedure Laterality Date    HX GYN      hysterectomy       Family History:  No family history on file. Social History:  Social History     Tobacco Use    Smoking status: Never Smoker    Smokeless tobacco: Not on file   Substance Use Topics    Alcohol use: No    Drug use: No       Allergies:  No Known Allergies      Review of Systems   Review of Systems   Constitutional: Negative for fever. Respiratory: Negative for shortness of breath. Cardiovascular: Negative for chest pain. Gastrointestinal: Negative for abdominal pain. Psychiatric/Behavioral: Positive for dysphoric mood and suicidal ideas. Negative for self-injury. The patient is not nervous/anxious. All other systems reviewed and are negative. Physical Exam     Vitals:    05/23/22 1209 05/23/22 1210   BP:  (!) 134/105   Pulse: 95    Resp: 18    Temp:  97.7 °F (36.5 °C)   SpO2: 100%      Physical Exam    Nursing notes and vital signs reviewed    Constitutional: Non toxic appearing, moderate distress  Head: Normocephalic, Atraumatic  Eyes: EOMI  Neck: Supple  Cardiovascular: Regular rate and rhythm, no murmurs, rubs, or gallops  Chest: Normal work of breathing and chest excursion bilaterally  Lungs: Clear to ausculation bilaterally  Back: No evidence of trauma or deformity  Extremities: No evidence of trauma or deformity  Skin: Warm and dry, normal cap refill  Neuro: Alert and appropriate  Psychiatric: Normal mood and affect makes good eye contact, denies SI or HI or hallucinations at this time. Normal speech and thought process. Is future oriented and endorse that she would never harm herself and just wants to be plugged in with outpatient therapy and restart her medications.       Diagnostic Study Results     Labs -     Recent Results (from the past 12 hour(s))   COVID-19 RAPID TEST    Collection Time: 05/23/22 12:30 PM   Result Value Ref Range    Specimen source Nasopharyngeal      COVID-19 rapid test Not detected NOTD     CBC WITH AUTOMATED DIFF    Collection Time: 05/23/22 12:30 PM   Result Value Ref Range    WBC 9.1 4.6 - 13.2 K/uL    RBC 5.17 4.20 - 5.30 M/uL    HGB 13.4 12.0 - 16.0 g/dL    HCT 43.1 35.0 - 45.0 %    MCV 83.4 78.0 - 100.0 FL    MCH 25.9 24.0 - 34.0 PG    MCHC 31.1 31.0 - 37.0 g/dL    RDW 14.5 11.6 - 14.5 %    PLATELET 149 728 - 907 K/uL    MPV 11.6 9.2 - 11.8 FL    NRBC 0.0 0  WBC    ABSOLUTE NRBC 0.00 0.00 - 0.01 K/uL    NEUTROPHILS 71 40 - 73 %    LYMPHOCYTES 20 (L) 21 - 52 %    MONOCYTES 6 3 - 10 %    EOSINOPHILS 2 0 - 5 %    BASOPHILS 1 0 - 2 %    IMMATURE GRANULOCYTES 0 0.0 - 0.5 %    ABS. NEUTROPHILS 6.5 1.8 - 8.0 K/UL    ABS. LYMPHOCYTES 1.8 0.9 - 3.6 K/UL    ABS. MONOCYTES 0.5 0.05 - 1.2 K/UL    ABS. EOSINOPHILS 0.2 0.0 - 0.4 K/UL    ABS. BASOPHILS 0.1 0.0 - 0.1 K/UL    ABS. IMM. GRANS. 0.0 0.00 - 0.04 K/UL    DF AUTOMATED     METABOLIC PANEL, COMPREHENSIVE    Collection Time: 05/23/22 12:30 PM   Result Value Ref Range    Sodium 139 136 - 145 mmol/L    Potassium 3.4 (L) 3.5 - 5.5 mmol/L    Chloride 106 100 - 111 mmol/L    CO2 28 21 - 32 mmol/L    Anion gap 5 3.0 - 18 mmol/L    Glucose 160 (H) 74 - 99 mg/dL    BUN 15 7.0 - 18 MG/DL    Creatinine 1.11 0.6 - 1.3 MG/DL    BUN/Creatinine ratio 14 12 - 20      GFR est AA >60 >60 ml/min/1.73m2    GFR est non-AA 51 (L) >60 ml/min/1.73m2    Calcium 9.5 8.5 - 10.1 MG/DL    Bilirubin, total 0.5 0.2 - 1.0 MG/DL    ALT (SGPT) 19 13 - 56 U/L    AST (SGOT) 16 10 - 38 U/L    Alk.  phosphatase 121 (H) 45 - 117 U/L    Protein, total 8.1 6.4 - 8.2 g/dL    Albumin 3.7 3.4 - 5.0 g/dL    Globulin 4.4 (H) 2.0 - 4.0 g/dL    A-G Ratio 0.8 0.8 - 1.7     ETHYL ALCOHOL    Collection Time: 05/23/22 12:30 PM   Result Value Ref Range    ALCOHOL(ETHYL),SERUM <3 0 - 3 MG/DL       Radiologic Studies -   No orders to display     CT Results  (Last 48 hours)    None CXR Results  (Last 48 hours)    None          Medications given in the ED-  Medications - No data to display      Medical Decision Making   I am the first provider for this patient. I reviewed the vital signs, available nursing notes, past medical history, past surgical history, family history and social history. Vital Signs-Reviewed the patient's vital signs. Pulse Oximetry Analysis - 100% on room air, not hypoxic     Records Reviewed: Nursing Notes    Provider Notes (Medical Decision Making): Angelica Fernandez is a 61 y.o. female presenting for depression and thoughts of self-harm earlier today. Patient reports she no longer has loss of self-harm and never had a specific plan. Patient has history of depression and no prior suicide attempts. Does have a family history of suicide attempts. Denies any weapons in the home. Patient does not want inpatient placement. Medically cleared. Patient is future oriented. [facility] involved to evaluate patient. They have evaluated patient and spoken with her family who she lives with and she is contracted for safety and family has made a safety plan as well. Plan for discharge with primary care and [facility] follow-up with the resources and hand [facility] has provided with return precautions. Patient understands and agrees with this plan and understands that she can return to the emergency department anytime if she feels unsafe. Procedures:  Procedures    ED Course:   CONSULT NOTE:   2:19 PM  Dr. Bianka Reinoso spoke with Pritesh Crowder with [employer]   Specialty: [facility]  Discussed pt's hx, disposition, and available diagnostic and imaging results over the telephone. Reviewed care plans. Will evaluate the patient, recommend petition for ECO.     2:28 PM  Updated patient on [employer]'s recommendations. Jeaneth Sadler police are already here with another patient he was under ECO and advised them that [facility] is also recommending we petition for an ECO for this patient as well.     CONSULT NOTE:   4:23 PM  Dr. Silvia Dorman spoke with Kan Nguyễn with [employer]   Specialty: [facility]  Discussed pt's hx, disposition, and available diagnostic and imaging results over the telephone. Reviewed care plans. Has evaluated patient and spoken with patients family. Patient has contracted for safety and made safety plan with her daughter with whom she lives with.     5:04 PM  Updated patient on all results and plan. All questions answered. Patient continues to deny suicidal ideation and continues to contract for safety. Provided with return precautions. Diagnosis and Disposition     Critical Care: None    DISCHARGE NOTE:    Trish Lopez  results have been reviewed with her. She has been counseled regarding her diagnosis, treatment, and plan. She verbally conveys understanding and agreement of the signs, symptoms, diagnosis, treatment and prognosis and additionally agrees to follow up as discussed. She also agrees with the care-plan and conveys that all of her questions have been answered. I have also provided discharge instructions for her that include: educational information regarding their diagnosis and treatment, and list of reasons why they would want to return to the ED prior to their follow-up appointment, should her condition change. She has been provided with education for proper emergency department utilization. CLINICAL IMPRESSION:    1. Depression, unspecified depression type        PLAN:  1. D/C Home  2. Current Discharge Medication List        3.    Follow-up Information     Follow up With Specialties Details Why Contact Info    Eliseo Rao MD Family Medicine Schedule an appointment as soon as possible for a visit   [address] [facility] [phone] 642 [facility]  Schedule an appointment as soon as possible for a visit   Eric [address], 2007 [address] [place] [address]    [facility] Emergency Medicine  If symptoms worsen [address] [id number]  [phone]        _______________________________      Please note that this dictation was completed with [employer], the computer voice recognition software. Quite often unanticipated grammatical, syntax, homophones, and other interpretive errors are inadvertently transcribed by the computer software. Please disregard these errors. Please excuse any errors that have escaped final proofreading.

## 2022-05-23 NOTE — ED NOTES
Per Dr. Helen Sales plan to d/c home. No longer need police at bedside per [facility] their plan is to d/c patient home just waiting for phone numbers for patient they informed dr. Helen Sales they would fax over the numbers and then d/c patient home.   Patient remains with a sittter

## 2022-05-23 NOTE — ED TRIAGE NOTES
Pt arrives to ed reporting suicidal ideations that began today. Pt did text family members in regards to different way to harm herself.

## 2023-05-21 RX ORDER — ISOSORBIDE DINITRATE 5 MG/1
2.5 TABLET ORAL 2 TIMES DAILY
COMMUNITY
Start: 2021-11-20

## 2023-05-21 RX ORDER — ASPIRIN 81 MG/1
81 TABLET ORAL DAILY
COMMUNITY
Start: 2021-11-21

## 2023-05-21 RX ORDER — HYDRALAZINE HYDROCHLORIDE 10 MG/1
10 TABLET, FILM COATED ORAL 3 TIMES DAILY
COMMUNITY
Start: 2021-11-20

## 2023-05-21 RX ORDER — NITROGLYCERIN 0.4 MG/1
0.4 TABLET SUBLINGUAL
COMMUNITY
Start: 2021-11-20

## 2023-05-21 RX ORDER — METOPROLOL SUCCINATE 50 MG/1
50 TABLET, EXTENDED RELEASE ORAL DAILY
COMMUNITY
Start: 2021-11-21

## 2023-05-21 RX ORDER — FUROSEMIDE 40 MG/1
40 TABLET ORAL DAILY
COMMUNITY
Start: 2021-11-20

## 2024-08-19 ENCOUNTER — HOSPITAL ENCOUNTER (EMERGENCY)
Facility: HOSPITAL | Age: 59
Discharge: HOME | End: 2024-08-19
Attending: EMERGENCY MEDICINE
Payer: MEDICAID

## 2024-08-19 VITALS
TEMPERATURE: 98.9 F | WEIGHT: 230 LBS | HEART RATE: 82 BPM | BODY MASS INDEX: 38.32 KG/M2 | OXYGEN SATURATION: 96 % | SYSTOLIC BLOOD PRESSURE: 169 MMHG | HEIGHT: 65 IN | DIASTOLIC BLOOD PRESSURE: 88 MMHG | RESPIRATION RATE: 18 BRPM

## 2024-08-19 DIAGNOSIS — H10.32 ACUTE BACTERIAL CONJUNCTIVITIS OF LEFT EYE: Primary | ICD-10-CM

## 2024-08-19 PROCEDURE — 6370000000 HC RX 637 (ALT 250 FOR IP): Performed by: EMERGENCY MEDICINE

## 2024-08-19 PROCEDURE — 99283 EMERGENCY DEPT VISIT LOW MDM: CPT

## 2024-08-19 PROCEDURE — 2500000003 HC RX 250 WO HCPCS: Performed by: EMERGENCY MEDICINE

## 2024-08-19 RX ORDER — CIPROFLOXACIN HYDROCHLORIDE 3.5 MG/ML
2 SOLUTION/ DROPS TOPICAL
Status: DISCONTINUED | OUTPATIENT
Start: 2024-08-19 | End: 2024-08-19 | Stop reason: HOSPADM

## 2024-08-19 RX ORDER — PROPARACAINE HYDROCHLORIDE 5 MG/ML
1 SOLUTION/ DROPS OPHTHALMIC
Status: COMPLETED | OUTPATIENT
Start: 2024-08-19 | End: 2024-08-19

## 2024-08-19 RX ADMIN — FLUORESCEIN SODIUM 1 MG: 1 STRIP OPHTHALMIC at 18:39

## 2024-08-19 RX ADMIN — PROPARACAINE HYDROCHLORIDE 1 DROP: 5 SOLUTION/ DROPS OPHTHALMIC at 18:39

## 2024-08-19 RX ADMIN — CIPROFLOXACIN 2 DROP: 3 SOLUTION OPHTHALMIC at 19:31

## 2024-08-19 ASSESSMENT — VISUAL ACUITY
OS: 20/40
OU: 20/40
OD: 20/70

## 2024-08-19 NOTE — ED TRIAGE NOTES
Pt presents to ED with c/o left eye swelling, blurry vision, and pain x3 days. Denies known injury or trauma, states \"it feels like there is something in my eye\". Pt eye noted to be swollen with red sclera.     A&OX4, ambulatory to triage

## 2024-08-22 NOTE — ED PROVIDER NOTES
MG tablet  Commonly known as: LASIX     hydrALAZINE 10 MG tablet  Commonly known as: APRESOLINE     isosorbide dinitrate 5 MG tablet  Commonly known as: ISORDIL     metoprolol succinate 50 MG extended release tablet  Commonly known as: TOPROL XL     nitroGLYCERIN 0.4 MG SL tablet  Commonly known as: NITROSTAT            3. [unfilled]  _______________________________    This note was partially transcribed via voice recognition software. Although efforts have been made to catch any discrepancies, it may contain sound alike words, grammatical errors, or nonsensical words.             Jak Taveras MD  08/22/24 1937